# Patient Record
Sex: MALE | Race: WHITE
[De-identification: names, ages, dates, MRNs, and addresses within clinical notes are randomized per-mention and may not be internally consistent; named-entity substitution may affect disease eponyms.]

---

## 2017-04-13 ENCOUNTER — HOSPITAL ENCOUNTER (OUTPATIENT)
Dept: HOSPITAL 84 - D.CATH | Age: 67
Discharge: HOME | End: 2017-04-13
Attending: INTERNAL MEDICINE
Payer: MEDICARE

## 2017-04-13 VITALS — DIASTOLIC BLOOD PRESSURE: 72 MMHG | SYSTOLIC BLOOD PRESSURE: 162 MMHG

## 2017-04-13 VITALS — BODY MASS INDEX: 31.9 KG/M2 | BODY MASS INDEX: 31.9 KG/M2 | WEIGHT: 235.49 LBS | HEIGHT: 72 IN

## 2017-04-13 DIAGNOSIS — I10: ICD-10-CM

## 2017-04-13 DIAGNOSIS — R06.02: ICD-10-CM

## 2017-04-13 DIAGNOSIS — Z82.49: ICD-10-CM

## 2017-04-13 DIAGNOSIS — R07.9: ICD-10-CM

## 2017-04-13 DIAGNOSIS — I25.119: Primary | ICD-10-CM

## 2017-04-13 LAB
ANION GAP SERPL CALC-SCNC: 15.8 MMOL/L (ref 8–16)
BASOPHILS NFR BLD AUTO: 0.2 % (ref 0–2)
BUN SERPL-MCNC: 15 MG/DL (ref 7–18)
CALCIUM SERPL-MCNC: 8.3 MG/DL (ref 8.5–10.1)
CHLORIDE SERPL-SCNC: 102 MMOL/L (ref 98–107)
CO2 SERPL-SCNC: 27.4 MMOL/L (ref 21–32)
CREAT SERPL-MCNC: 1 MG/DL (ref 0.6–1.3)
EOSINOPHIL NFR BLD: 1.1 % (ref 0–7)
ERYTHROCYTE [DISTWIDTH] IN BLOOD BY AUTOMATED COUNT: 13.3 % (ref 11.5–14.5)
GLUCOSE SERPL-MCNC: 176 MG/DL (ref 74–106)
HCT VFR BLD CALC: 43.8 % (ref 42–54)
HGB BLD-MCNC: 14.9 G/DL (ref 13.5–17.5)
IMM GRANULOCYTES NFR BLD: 0.2 % (ref 0–5)
LYMPHOCYTES NFR BLD AUTO: 39.6 % (ref 15–50)
MCH RBC QN AUTO: 32.4 PG (ref 26–34)
MCHC RBC AUTO-ENTMCNC: 34 G/DL (ref 31–37)
MCV RBC: 95.2 FL (ref 80–100)
MONOCYTES NFR BLD: 8.5 % (ref 2–11)
NEUTROPHILS NFR BLD AUTO: 50.4 % (ref 40–80)
OSMOLALITY SERPL CALC.SUM OF ELEC: 283 MOSM/KG (ref 275–300)
PLATELET # BLD: 179 10X3/UL (ref 130–400)
PMV BLD AUTO: 11.3 FL (ref 7.4–10.4)
POTASSIUM SERPL-SCNC: 5.2 MMOL/L (ref 3.5–5.1)
RBC # BLD AUTO: 4.6 10X6/UL (ref 4.2–6.1)
SODIUM SERPL-SCNC: 140 MMOL/L (ref 136–145)
WBC # BLD AUTO: 4.4 10X3/UL (ref 4.8–10.8)

## 2017-04-13 NOTE — NUR
RESTING QUIETLY IN BED. 2L NC, NO RESP DISTRESS NOTED. VSS. NO C/O
CHEST PAIN OR NAUSEA. RIGHT WRIST TR BAND WITH WRIST PROTECTOR IN
PLACE, DRSG CDI. FAMILY AT BEDSIDE, CALL LIGHT WITHIN REACH.

## 2017-04-13 NOTE — NUR
HOB ELEVATED 45 DEGREES. SPRITE GIVEN. 2L NC, NO RESP DISTRESS NOTED.
VSS. RIGHT WRIST TR BAND IN PLACE WITH WRIST PROTECTOR, DRSG CDI. NO
C/O CHEST PAIN OR NAUSEA. WILL CONTINUE TO MONITOR.

## 2017-04-13 NOTE — HEMODYNAMI
PATIENT:MARINA ASHFORD                                      MEDICAL RECORD: [id number]
: 46                                            LOCATION:D.CAT          
ACCT# [id number]                                       ADMISSION DATE: 17
 
 
 Generatedon:20179:45
Patient name: MARINA ASHFORD Patient #: [id number] Visit #: [id number] SSN: : 
1950 Date of
study: 2017
Page: Of
Hemodynamic Procedure Report
****************************
Patient Data
Patient Demographics
Procedure consent was obtained
First Name: MARINA             Gender: Male
Last Name: DEJON          : 1950
Patient #: [id number]       Age: 66 year(s)
Visit #: [id number]       Race: Unknown
Accession #:
[id number]
Additional ID: [id number]
Contact details
Address: [address] [place] [address]    Phone: [phone]
State: [place]
City: [place]
Zip code: [address]
Past Medical History
Allergies: No known allergies
Admission
Admission Data
Admission Date: 2017   Admission Time: 6:57
Height (in.): 72            BSA: 2.28 (m2)
Height (cm.): 182.88        BMI: 31.87 (kg/m2)
Weight (lbs.): 235
Weight (kg.): 106.59
Lab Results
Lab Result Date: 2017  Lab Result Time: 0:00
Biochemistry
Name         Units    Result                Min      Max
Creatinine   mg/dl    1        --(--*-)--   0.6      1.3
CBC
Name         Units    Result                Min      Max
Hemoglobin   g/dl     14.9     --(-*--)--   13.5     17.5
Procedure
Procedure Types
Cath Procedure
Diagnostic Procedure
LHC
LHC w/Coronaries
Miscellaneous Procedures
Moderate Sedation up to 30 minutes
Procedure Description
Procedure Date
Procedure Date: 2017
Procedure Start Time: 9:28
Procedure End Time: 9:44
Procedure Staff
Name                            Function
 
Lake Schumacher MD                   Performing Physician
Sil Croft RT             Scrub
Geovanny Evans RN                Nurse
Sekou Leong RT                  Monitor
Procedure Data
Cath Procedure
Fluoroscopy
Diagnostic fluoroscopy      Total fluoroscopy Time: 4.1
time: 4.1 min               min
Diagnostic fluoroscopy      Total fluoroscopy dose: 763
dose: 763 mGy               mGy
Contrast Material
Contrast Material Type                       Amount (ml)
Isovue 300                                   107
Entry Location
Entry     Primary  Successful  Side  Size  Upsize Upsize Entry    Closure     [place]
ccessful  Closure
Location                             (Fr)  1 (Fr) 2 (Fr) Remarks  Device        
          Remarks
Radial                         Right 6 Fr                         Mechanical
artery                               Short                        Compression
Estimated blood loss: 5 ml
Diagnostic catheters
Device Type               Used For           End Catheter
Placement
Terumo 5Fr Jerald 110cm    Procedure
catheter
Diagnostic Infinity 5Fr   Procedure
AL 1 catheter
Procedure Complications
No complications
Procedure Medications
Medication           Administration Route Dosage
Oxygen               NC                   2 l/min
Heparin Flush Bag    added to field       2 bags
(1000units/500ml NS)
0.9% NaCl            I.V.                 100 ml/hr
Radial Cocktail      added to field       1 syringe
(Verapomil 2mg/Nitro
400mcg/Heparin
1500units)
Fentanyl             I.V.                 50 mcg
Versed               I.V.                 1 mg
Fentanyl             I.V.                 50 mcg
Versed               I.V.                 1 mg
Radial Cocktail      I.A.                 1 syringe
(Verapomil 2mg/Nitro
400mcg/Heparin
1500units)
Fentanyl             I.V.                 50 mcg
Hemodynamics
Rest
BSA: 2.28 (m2) HGB: 14.9 (g/dl) O2 Consumption: Estimated: 259.03 (ml/min) O2 Co
nsumption indexed:
Estimated:113.61 (ml/min/m) Heart Rate: 63 (bpm)
Pressure Samples
Time     Site     Value (mmHg) Purpose      Heart      Use
Rate(bpm)
9:32     LV       120/-4,13    Snapshot     70
Gradients
 
Valve  Time  Site Site Mean    SEP/DFP    Peak To Heart  Use
1    2    (mmHg)  (sec/min)  Peak    Rate
(mmHg)  (bpm)
Aortic 9:32  LV   AO                              70
Snapshots
Pre Cath      Intra         NCS           Post Cath
Vital Signs
Time    Heart  Resp   SPO2 etCO2   IL9hpbs NIBP (mmHg) Rhythm  Pain    Sedation
Rate   (ipm)  (%)  (mmHg)  (mmHg)                      Status  Level
(bpm)
9:02:35 63     18     100  0       0       145/77(120) NSR     0 (11)  10(A)
, No
pain
9:06:55 65     17     95   0       0       138/78(98)  NSR     0 (11)  10(A)
, No
pain
9:11:17 62     20     93   0       0       136/65(115) NSR     0 (11)  10(A)
, No
pain
9:15:40 60     16     96   0       0       133/70(112) NSR     0 (11)  10(A)
, No
pain
9:19:53 67     18     95   0       0       124/68(99)  NSR     0 (11)  10(A)
, No
pain
9:24:09 61     18     96   0       0       125/73(104) NSR     0 (11)  10(A)
, No
pain
9:28:27 59     19     96   0       0       136/69(117) NSR     0 (11)  9(A)
, No
pain
9:32:41 70     18     95   0       0       111/53(88)  NSR     0 (11)  9(A)
, No
pain
9:36:54 75     20     95   0       0       121/67(96)  NSR     0 (11)  9(A)
, No
pain
9:41:09 69     18     94   0       0       128/66(103) NSR     0 (11)  9(A)
, No
pain
9:42:48 70     19     95   0       0       120/69(97)  NSR     0 (11)  9(A)
, No
pain
Medications
Time    Medication       Route  Dose    Verified Delivered Reason       Notes  E
ffectiveness
by       by
9:05:21 Oxygen           NC     2 l/min Geovanny Small    Per
Nathan Evans RN physician
RN
9:05:31 Heparin Flush    added  2 bags  Geovanny Small    used for
Bag              to             Nathan Evans RN procedure
(1000units/500ml field          RN
NS)
9:05:42 0.9% NaCl        I.V.   100     Geovanny   Geovanny    Per
ml/hr   Nathan Evans RN physician
RN
9:05:50 Radial Cocktail  added  1       Geovanny   Geovanny    used for
(Verapomil       to     syringe Nathan Evans RN procedure
2mg/Nitro        field          RN
 
400mcg/Heparin
1500units)
9:26:04 Fentanyl         I.V.   50 mcg  Geovanny   Geovanny    for sedation
Nathan Evans RN
RN
9:26:11 Versed           I.V.   1 mg    Geovanny   Geovanny    for sedation
Nathan Evans RN
RN
9:29:37 Fentanyl         I.V.   50 mcg  Geovanny   Geovanny    for sedation
Nathan Evans RN
RN
9:29:39 Versed           I.V.   1 mg    Geovanny   Geovanny    for sedation
Nathan Evans RN
RN
9:30:59 Radial Cocktail  I.A.   1       Geovanny   Lake      for
(Verapomil              syringe Nathan Schumacher MD  vasodilation
2mg/Nitro                       RN
400mcg/Heparin
1500units)
9:33:41 Fentanyl         I.V.   50 mcg  Geovanny Small    for sedation
Nathan Evans RN
RN
Procedure Log
Time     Note
8:33:27  Geovanny Evans RN sent for patient. Start room use.
8:43:16  Lab Result : Hemoglobin 14.9 g/dl
8:43:16  Lab Result : Creatinine 1 mg/dl
8:43:26  Time tracking: Regular hours
8:43:34  Plan of Care:Hemodynamics will remain stable., Cardiac
rhythm will remain stable., Comfort level will be
maintained., Respiratory function will remain
adequate., Patient/ family verbilizes understanding of
procedure., Procedure tolerated without complication.,
Recovers from procedure without complications..
8:49:12  Patient received from Pre/Post Procedure Room to CCL 1
Alert and oriented. Tansferred to table in Supine
position.
8:49:13  Warm blankets applied, and eric hugger turned on for
patient comfort.
8:49:13  Correct patient and procedure confirmed by team.
8:49:15  Signed procedure consent form obtained from patient.
8:49:16  ECG and BP/O2 sat monitors applied to patient.
8:49:17  Full Disclosure recording started
9:01:19  Vital chart was started
9:02:08  Baseline sample Acquired.
9:02:13  Rhythm: sinus rhythm
9:02:32  H&P Date Dictated: 2017 Within 30 days and on
chart., H&P Addendum completed by physician on day of
procedure. (MUST COMPLETE FOR ALL OUTPATIENTS).
9:02:34  Pre-procedure instructions explained to patient.
9:02:34  Pre-op teaching completed and patient verbalized
understanding.
9:02:36  Family in waiting room.
9:02:37  Patient NPO since Midnight.
9:02:42  Patient allergic to No known allergies
9:02:46  Is the patient allergic to Iodine/contrast media? No.
9:02:55  Is patient on blood thinner?No
9:02:56  Patient diabetic? Yes.
9:02:57  If diabetic: On Metformin? Yes
9:05:15  If on Metformin: Last Dose? 2017
 
9:05:19  Previous problem with sedation/anesthesia? No ?
9:05:21  Oxygen 2 l/min NC was administered by Geovanny Evans
RN; Per physician;
9:05:22  Snore? Yes
9:05:23  Sleep apnea? Yes
9:05:24  Deviated septum? No
9:05:27  Opens mouth fully? Yes
9:05:27  Sticks out tongue? Yes
9:05:30  Airway obstruction? No ?
9:05:31  Heparin Flush Bag (1000units/500ml NS) 2 bags added to
field was administered by Geovanny Evans RN; used for
procedure;
9:05:34  Dentures? No ?
9:05:39  Patient pain scale 0/10 ?.
9:05:42  0.9% NaCl 100 ml/hr I.V. was administered by Geovanny Evans RN; Per physician;
9:05:50  Radial Cocktail (Verapomil 2mg/Nitro 400mcg/Heparin
1500units) 1 syringe added to field was administered
by Geovanny Evans RN; used for procedure;
9:05:52  IV patent on arrival in left hand with 0.9% NaCl at
[facility].
9:05:58  Lab results completed and on chart.
9:06:00  Right Radial & Right Groin area was prepped with
chlora-prep and draped in sterile fashion
9:06:02  Alarms reviewed by R. N.
9:06:02  Sharps counted by scrub and verified by R.N.
9:06:07  Use device set Radial Dx
9:06:08  MBrace Wrist Support opened to sterile field.
9:06:09  Tegaderm 4 x 4 opened to sterile field.
9:06:10  Acist Manifold opened to sterile field.
9:06:10  Acist Hand Control opened to sterile field.
9:06:11  Acist Syringe opened to sterile field.
9:06:12  Medline Cath Pack opened to sterile field.
9:06:13  Bag Decanter opened to sterile field.
9:06:13  Terumo 6Fr Slender Glidesheath opened to sterile
field.
9:06:14  St Ace 260cm J .035 wire opened to sterile field.
9:06:34  Patient Weight : 235 kg
9:06:43  Patient Height : 72 cm
9:07:47  Cook 21G 4cm Radial Needle opened to sterile field.
9:14:50  Zero performed for pressure channel P1
9:24:15  Physician arrived
9:24:15  --------ALL STOP TIME OUT------
9:24:16  Final Timeout: patient, procedure, and site verified
with staff and physician. All members of the team are
in agreement.
9:24:19  Right Radial & Right Groin site verified by team.
9:24:22  Physical assessment completed. ASA score P 2 - A
patient with mild systemic disease as per Lake Schumacher MD.
9:24:25  Sedation plan: IV Moderate Sedation Versed, Fentanyl
9:26:04  Fentanyl 50 mcg I.V. was administered by Geovanny Evans
RN; for sedation;
9::11  Versed 1 mg I.V. was administered by Geovanny Evans RN;
for sedation;
9::37  Procedure started.
9::42  Local anesthetic to right radial artery with Lidocaine
2% by Lake Schumacher MD.**INITIAL ACCESS ONLY**
9::37  Fentanyl 50 mcg I.V. was administered by Geovanny Evans
RN; for sedation;
 
9::39  Versed 1 mg I.V. was administered by Geovanny Evans RN;
for sedation;
9:30:04  A 6 Fr Short sheath was inserted into the Right Radial
artery
9:30:52  A Terumo 5Fr Jerald 110cm catheter was advanced over
the wire and used for Procedure.
9:30:59  Radial Cocktail (Verapomil 2mg/Nitro 400mcg/Heparin
1500units) 1 syringe I.A. was administered by Lake Schumacher MD; for vasodilation;
9:32:25  LV gram done using KEMP
9:32:29  Injector settings: Ml/sec: 5, Volume: 15,
9:32:33  EF : 55 %
9:32:34  LV hemodynamics recorded.
9:33:12  LCA angiography performed.
9:33:41  Fentanyl 50 mcg I.V. was administered by Geovanny Evans
RN; for sedation;
9:37:00  Catheter exchanged over wire.
9:37:15  A Diagnostic Infinity 5Fr AL 1 catheter was advanced
over the wire and used for Procedure.
9:37:59  RCA angiography performed.
9:39:32  Catheter removed.
9:40:19  Terumo TR Band Standard opened to sterile field.
9:41:46  Sheath removed intact; hemostasis achieved with
Mechanical Compression to the Right Radial artery.
9:41:48  Procedure ended.(Physican Out)
9:42:20  Fluoroscopy time 04.10 minutes.
9:42:24  Fluoroscopy dose: 763 mGy
9:42:24  Flurop Dose total: 763
9:42:48  Contrast amount:Isovue 300 107ml.
9:42:50  Sharps counted by scrub and verified by R.N.
9:42:53  TR band inflated with 12cc of air.
9:42:54  Insertion/operative site no bleeding no hematoma.
9:43:01  Post right radial artery:stable, clean and dry
9:43:03  Post Procedure Pulses reassessed and unchanged
9:43:05  Post-procedure physical assessment completed. ASA
score P 2 - A patient with mild systemic disease as
per Lake Schumacher MD.
9:43:08  Post procedure rhythm: unchanged.
9:43:11  Estimated blood loss: 5 ml
9:43:13  Post procedure instruction explained to
patient.Patient verbalizes understanding.
9:43:14  Patient needs reinforcement of post procedure
teaching.
9:44:25  Procedure type changed to Cath procedure, Diagnostic
procedure, LHC, LHC w/Coronaries, Miscellaneous
Procedures, Moderate Sedation up to 30 minutes
9:44:26  Procedure and supply charges have been captured,
reviewed, submitted and are correct.
9:44:47  Procedure Complication : No complications
9:44:50  Vital chart was stopped
9:44:50  See physician's report for complete and final results.
9:44:54  Report given to Pre/Post Procedure Room.
9:44:56  Patient transfered to Pre/Post Procedure Room with
Stretcher.
9:44:58  Procedure ended.
9:44:58  Full Disclosure recording stopped
9:45:03  End room use (Document Last)
Device Usage
Item Name   Manufacture  Quantity  Catalog      Hospital Part    Current Minimal
 Lot# /
 
Number       Charge   Number  Stock   Stock   Serial#
Code
MBrace      Advanced     1         [id number]  [id number]   82995   840313  5
Wrist       Vascular
Support     Dynamics
Tegaderm 4  3M           1         [id number]        919530   166532  422547  5
x 4
Acist       Acist        1         13113        236722   773832  497128  5
Manifold    [employer]
Acist Hand  Acist        1         73517        659912   559661  679164  5
Control     [employer]
Acist       Acist        1         35291        594704   739407  363339  20
Syringe     Medical
Systems [employer]
Medline     Cardinal     1         [id number]    587182   94482   556500  5
Cath Pack   Health
Bag         [employer]     1         2002S        996263   16420   648515  5
[employer]
Terumo 6Fr  Terumo       1         [id number]   029192   034840  904545  40
Slender
Glidesheath
St Ace     St Ace      1         214816       320081   005900  016041  30
260cm J
.035 wire
Cook 21G    Cook Medical 1         [id number]       526161   101171  869947  5
4cm Radial
Needle
Terumo 5Fr  Terumo       1               025542   375270  787947  5
Jerald 110cm
catheter
Diagnostic  Cardinal     1         [id number]      582905   090505  684955  15
[employer]    Health
5Fr AL 1
catheter
Terumo TR   Terumo       1         [id number]    647240   402827  211267  40
Band
Standard
Signature Audit [place]
Stage           Time        Signature      Unsigned
Intra-Procedure 2017   Sekou Leong
9:45:29 AM  RT(R)
Signatures
Monitor : Sekou Leong RT Signature :
______________________________
Date : ______________ Time :
______________
 
 
 
 
 
 
 
[facility]                                          
[address] [place] [address]

## 2017-04-13 NOTE — NUR
2L NC, NO RESP DISTRESS NOTED. VSS. RIGHT WRIST TR BAND WITH WRIST
PROTECTOR IN PLACE, DRSG CDI. NO C/O CHEST PAIN OR NAUSEA. CALL LIGHT
WITHIN REACH.

## 2017-04-13 NOTE — NUR
SANDWICH TRAY GIVEN. 2L NC, NO RESP DISTRESS. NO C/O CHEST PAIN OR
NAUSEA. RIGHT WRIST TR BAND AND WRIST PROTECTOR IN PLACE, DRSG CDI.
VSS. WILL CONTINUE TO MONITOR.

## 2017-04-14 NOTE — OP
PATIENT NAME:  MARINA ASHFORD                               MEDICAL RECORD: [id number]
:46                                             LOCATION:D.CAT          
                                                         ADMISSION DATE:        
SURGEON:  J CARLOS PALMER M.D.          
 
 
DATE OF OPERATION:  2017
 
Catheterization Report
 
PROCEDURES PERFORMED:
1.  Selective coronary angiography.
2.  Left heart catheterization with ventriculogram.
 
INDICATION:  A 66-year-old gentleman, who presents with symptoms of accelerating
angina.
 
REFERRING PHYSICIAN:  Jayden Friedman MD
 
EQUIPMENT USED:  A 5-Lao Jerald catheter.
 
TECHNIQUE:  A 6-Lao sheath was inserted in retrograde fashion in the right
radial artery.  Next, selective coronary angiography was performed in standard
5-Lao Jerald catheter.  Left heart catheterization was performed using a Jerald
catheter as well.
 
CORONARY ANATOMY:
1.  Left main:  Left main trunk is large in caliber.  It gives rise to the LAD
and circumflex.  There is no obstruction.
2.  LAD:  This is a large caliber vessel extending to the apex.  The proximal
vessel has an ulcerated 90% stenosis.
3.  Circumflex:  This vessel is moderate in caliber.  At the bifurcation point,
the lateral branch in the AV circumflex, there is angulated 95% stenosis.  It is
heavily calcified in this area as well.
4.  Right coronary artery:  This vessel actually arises from the noncoronary
cusp.  The proximal mid vessel is moderately calcified.  The junction of the mid
and distal vessel has a 95% stenosis.  There is another 95% stenosis before the
bifurcation.
5.  Left ventricle:  Left ventricle is normal in size and function.  No wall
motion abnormalities are noted.  Estimated ejection fraction is 55%.
 
IMPRESSION:
1.  Three-vessel coronary artery disease.
2.  Normal left ventricular function.
 
RECOMMENDATIONS:  I will review the situation with the patient regarding
multivessel angioplasty versus bypass grafting.  Because of the calcified nature
of his vessels, he may do better long term with bypass grafting.
 
TRANSINT:[id number] Voice Confirmation ID: [id number] DOCUMENT ID: [id number]
 
 
 
OPERATIVE REPORT                               [id number]    MARINA ASHFORDJ CARLOS HELMS M.D.          
 
 
 
Electronically Signed by J CARLOS PALMER on 17 at 1224
 
 
 
 
 
 
 
 
 
 
 
 
 
 
 
 
 
 
 
 
 
 
 
 
 
 
 
 
 
 
 
 
 
 
 
 
 
 
 
 
 
CC:                                                             [id number]
DICTATION DATE: 17 0944     :     17 1113      DEP CLI 
                                                                      17
[facility]                                          
[address] [place] [address]

## 2017-04-17 ENCOUNTER — HOSPITAL ENCOUNTER (OUTPATIENT)
Dept: HOSPITAL 84 - D.US | Age: 67
Discharge: HOME | End: 2017-04-17
Attending: THORACIC SURGERY (CARDIOTHORACIC VASCULAR SURGERY)
Payer: MEDICARE

## 2017-04-17 VITALS — BODY MASS INDEX: 31.9 KG/M2

## 2017-04-17 DIAGNOSIS — I65.23: ICD-10-CM

## 2017-04-17 DIAGNOSIS — Z01.812: Primary | ICD-10-CM

## 2017-04-17 DIAGNOSIS — I25.10: ICD-10-CM

## 2017-04-18 LAB — HCV AB S/CO SERPL IA: <0.1 (ref 0–0.9)

## 2017-04-20 LAB
ALBUMIN SERPL-MCNC: 3.9 G/DL (ref 3.4–5)
ALP SERPL-CCNC: 50 U/L (ref 46–116)
ALT SERPL-CCNC: 42 U/L (ref 10–68)
ANION GAP SERPL CALC-SCNC: 12.7 MMOL/L (ref 8–16)
APPEARANCE UR: CLEAR
APTT BLD: 24.3 SECONDS (ref 22.8–39.4)
BASOPHILS NFR BLD AUTO: 0.2 % (ref 0–2)
BILIRUB SERPL-MCNC: 0.33 MG/DL (ref 0.2–1.3)
BILIRUB SERPL-MCNC: NEGATIVE MG/DL
BUN SERPL-MCNC: 15 MG/DL (ref 7–18)
CA TITR SERPL AGGL: (no result) {TITER}
CALCIUM SERPL-MCNC: 9.1 MG/DL (ref 8.5–10.1)
CHLORIDE SERPL-SCNC: 104 MMOL/L (ref 98–107)
CO2 SERPL-SCNC: 30.9 MMOL/L (ref 21–32)
COLD SCREEN ROOM TEMP: NEGATIVE
COLOR UR: YELLOW
CREAT SERPL-MCNC: 1.1 MG/DL (ref 0.6–1.3)
EOSINOPHIL NFR BLD: 0.8 % (ref 0–7)
ERYTHROCYTE [DISTWIDTH] IN BLOOD BY AUTOMATED COUNT: 12.9 % (ref 11.5–14.5)
EST. AVERAGE GLUCOSE BLD GHB EST-MCNC: 194 MG/DL (ref 74–154)
GLOBULIN SER-MCNC: 3.2 G/L
GLUCOSE SERPL-MCNC: 100 MG/DL
GLUCOSE SERPL-MCNC: 221 MG/DL (ref 74–106)
HBA1C MFR BLD: 8.4 % (ref 4.8–6)
HCT VFR BLD CALC: 42.4 % (ref 42–54)
HGB BLD-MCNC: 14.2 G/DL (ref 13.5–17.5)
IMM GRANULOCYTES NFR BLD: 0.2 % (ref 0–5)
INR PPP: 0.91 (ref 0.85–1.17)
KETONES UR STRIP-MCNC: NEGATIVE MG/DL
LEUKOCYTE ESTERASE: NEGATIVE
LYMPHOCYTES NFR BLD AUTO: 30.5 % (ref 15–50)
MCH RBC QN AUTO: 32.3 PG (ref 26–34)
MCHC RBC AUTO-ENTMCNC: 33.5 G/DL (ref 31–37)
MCV RBC: 96.4 FL (ref 80–100)
MONOCYTES NFR BLD: 6.4 % (ref 2–11)
NEUTROPHILS NFR BLD AUTO: 61.9 % (ref 40–80)
NITRITE UR-MCNC: NEGATIVE MG/ML
OSMOLALITY SERPL CALC.SUM OF ELEC: 292 MOSM/KG (ref 275–300)
PH UR STRIP: 5 [PH] (ref 5–6)
PHOSPHATE SERPL-MCNC: 2.7 MG/DL (ref 2.5–4.9)
PLATELET # BLD: 163 10X3/UL (ref 130–400)
PMV BLD AUTO: 9.3 FL (ref 7.4–10.4)
POTASSIUM SERPL-SCNC: 4.6 MMOL/L (ref 3.5–5.1)
PROT SERPL-MCNC: 7.1 G/DL (ref 6.4–8.2)
PROT UR-MCNC: NEGATIVE MG/DL
PROTHROMBIN TIME: 12.1 SECONDS (ref 11.6–15)
RBC # BLD AUTO: 4.4 10X6/UL (ref 4.2–6.1)
SODIUM SERPL-SCNC: 143 MMOL/L (ref 136–145)
SP GR UR STRIP: 1.01 (ref 1–1.02)
T4 FREE SERPL-MCNC: 0.92 NG/DL (ref 0.76–1.46)
TSH SERPL-ACNC: 2.54 UIU/ML (ref 0.36–3.74)
URATE SERPL-MCNC: 5.3 MG/DL (ref 2.6–7.2)
UROBILINOGEN UR-MCNC: NORMAL MG/DL
WBC # BLD AUTO: 4.9 10X3/UL (ref 4.8–10.8)

## 2017-04-21 ENCOUNTER — HOSPITAL ENCOUNTER (INPATIENT)
Dept: HOSPITAL 84 - D.SDCHOLD | Age: 67
LOS: 11 days | Discharge: HOME | DRG: 236 | End: 2017-05-02
Attending: THORACIC SURGERY (CARDIOTHORACIC VASCULAR SURGERY) | Admitting: THORACIC SURGERY (CARDIOTHORACIC VASCULAR SURGERY)
Payer: MEDICARE

## 2017-04-21 VITALS — DIASTOLIC BLOOD PRESSURE: 49 MMHG | SYSTOLIC BLOOD PRESSURE: 114 MMHG

## 2017-04-21 VITALS — SYSTOLIC BLOOD PRESSURE: 129 MMHG | DIASTOLIC BLOOD PRESSURE: 55 MMHG

## 2017-04-21 VITALS — SYSTOLIC BLOOD PRESSURE: 105 MMHG | DIASTOLIC BLOOD PRESSURE: 54 MMHG

## 2017-04-21 VITALS — SYSTOLIC BLOOD PRESSURE: 114 MMHG | DIASTOLIC BLOOD PRESSURE: 48 MMHG

## 2017-04-21 VITALS — SYSTOLIC BLOOD PRESSURE: 119 MMHG | DIASTOLIC BLOOD PRESSURE: 46 MMHG

## 2017-04-21 VITALS — SYSTOLIC BLOOD PRESSURE: 115 MMHG | DIASTOLIC BLOOD PRESSURE: 52 MMHG

## 2017-04-21 VITALS — SYSTOLIC BLOOD PRESSURE: 115 MMHG | DIASTOLIC BLOOD PRESSURE: 51 MMHG

## 2017-04-21 VITALS — DIASTOLIC BLOOD PRESSURE: 50 MMHG | SYSTOLIC BLOOD PRESSURE: 116 MMHG

## 2017-04-21 VITALS — DIASTOLIC BLOOD PRESSURE: 55 MMHG | SYSTOLIC BLOOD PRESSURE: 115 MMHG

## 2017-04-21 VITALS — SYSTOLIC BLOOD PRESSURE: 108 MMHG | DIASTOLIC BLOOD PRESSURE: 57 MMHG

## 2017-04-21 VITALS — SYSTOLIC BLOOD PRESSURE: 119 MMHG | DIASTOLIC BLOOD PRESSURE: 51 MMHG

## 2017-04-21 VITALS — SYSTOLIC BLOOD PRESSURE: 110 MMHG | DIASTOLIC BLOOD PRESSURE: 45 MMHG

## 2017-04-21 VITALS — SYSTOLIC BLOOD PRESSURE: 108 MMHG | DIASTOLIC BLOOD PRESSURE: 44 MMHG

## 2017-04-21 VITALS
HEIGHT: 72 IN | WEIGHT: 229.68 LBS | WEIGHT: 229.68 LBS | BODY MASS INDEX: 31.11 KG/M2 | BODY MASS INDEX: 31.11 KG/M2 | BODY MASS INDEX: 31.11 KG/M2 | HEIGHT: 72 IN | BODY MASS INDEX: 31.11 KG/M2

## 2017-04-21 VITALS — SYSTOLIC BLOOD PRESSURE: 118 MMHG | DIASTOLIC BLOOD PRESSURE: 52 MMHG

## 2017-04-21 VITALS — DIASTOLIC BLOOD PRESSURE: 45 MMHG | SYSTOLIC BLOOD PRESSURE: 118 MMHG

## 2017-04-21 VITALS — DIASTOLIC BLOOD PRESSURE: 50 MMHG | SYSTOLIC BLOOD PRESSURE: 111 MMHG

## 2017-04-21 VITALS — DIASTOLIC BLOOD PRESSURE: 90 MMHG | SYSTOLIC BLOOD PRESSURE: 166 MMHG

## 2017-04-21 VITALS — SYSTOLIC BLOOD PRESSURE: 116 MMHG | DIASTOLIC BLOOD PRESSURE: 47 MMHG

## 2017-04-21 VITALS — DIASTOLIC BLOOD PRESSURE: 60 MMHG | SYSTOLIC BLOOD PRESSURE: 140 MMHG

## 2017-04-21 VITALS — DIASTOLIC BLOOD PRESSURE: 46 MMHG | SYSTOLIC BLOOD PRESSURE: 115 MMHG

## 2017-04-21 VITALS — DIASTOLIC BLOOD PRESSURE: 51 MMHG | SYSTOLIC BLOOD PRESSURE: 95 MMHG

## 2017-04-21 VITALS — DIASTOLIC BLOOD PRESSURE: 54 MMHG | SYSTOLIC BLOOD PRESSURE: 140 MMHG

## 2017-04-21 VITALS — SYSTOLIC BLOOD PRESSURE: 118 MMHG | DIASTOLIC BLOOD PRESSURE: 50 MMHG

## 2017-04-21 VITALS — SYSTOLIC BLOOD PRESSURE: 103 MMHG | DIASTOLIC BLOOD PRESSURE: 55 MMHG

## 2017-04-21 VITALS — SYSTOLIC BLOOD PRESSURE: 115 MMHG | DIASTOLIC BLOOD PRESSURE: 55 MMHG

## 2017-04-21 VITALS — SYSTOLIC BLOOD PRESSURE: 122 MMHG | DIASTOLIC BLOOD PRESSURE: 48 MMHG

## 2017-04-21 VITALS — DIASTOLIC BLOOD PRESSURE: 54 MMHG | SYSTOLIC BLOOD PRESSURE: 120 MMHG

## 2017-04-21 VITALS — SYSTOLIC BLOOD PRESSURE: 112 MMHG | DIASTOLIC BLOOD PRESSURE: 49 MMHG

## 2017-04-21 VITALS — DIASTOLIC BLOOD PRESSURE: 52 MMHG | SYSTOLIC BLOOD PRESSURE: 123 MMHG

## 2017-04-21 VITALS — DIASTOLIC BLOOD PRESSURE: 61 MMHG | SYSTOLIC BLOOD PRESSURE: 136 MMHG

## 2017-04-21 DIAGNOSIS — E11.9: ICD-10-CM

## 2017-04-21 DIAGNOSIS — I48.0: ICD-10-CM

## 2017-04-21 DIAGNOSIS — I10: ICD-10-CM

## 2017-04-21 DIAGNOSIS — Z86.010: ICD-10-CM

## 2017-04-21 DIAGNOSIS — K56.0: ICD-10-CM

## 2017-04-21 DIAGNOSIS — K21.9: ICD-10-CM

## 2017-04-21 DIAGNOSIS — K58.0: ICD-10-CM

## 2017-04-21 DIAGNOSIS — M19.90: ICD-10-CM

## 2017-04-21 DIAGNOSIS — I25.119: Primary | ICD-10-CM

## 2017-04-21 DIAGNOSIS — E78.5: ICD-10-CM

## 2017-04-21 DIAGNOSIS — Z91.09: ICD-10-CM

## 2017-04-21 DIAGNOSIS — M10.9: ICD-10-CM

## 2017-04-21 LAB
ANION GAP SERPL CALC-SCNC: 10.6 MMOL/L (ref 8–16)
APTT BLD: 33.1 SECONDS (ref 22.8–39.4)
BUN SERPL-MCNC: 11 MG/DL (ref 7–18)
CALCIUM SERPL-MCNC: 7 MG/DL (ref 8.5–10.1)
CHLORIDE SERPL-SCNC: 110 MMOL/L (ref 98–107)
CO2 SERPL-SCNC: 31.5 MMOL/L (ref 21–32)
CREAT SERPL-MCNC: 0.9 MG/DL (ref 0.6–1.3)
ERYTHROCYTE [DISTWIDTH] IN BLOOD BY AUTOMATED COUNT: 13 % (ref 11.5–14.5)
GLUCOSE SERPL-MCNC: 183 MG/DL (ref 74–106)
HCT VFR BLD CALC: 26.1 % (ref 42–54)
HGB BLD-MCNC: 8.9 G/DL (ref 13.5–17.5)
INR PPP: 1.39 (ref 0.85–1.17)
MCH RBC QN AUTO: 32.4 PG (ref 26–34)
MCHC RBC AUTO-ENTMCNC: 34.1 G/DL (ref 31–37)
MCV RBC: 94.9 FL (ref 80–100)
OSMOLALITY SERPL CALC.SUM OF ELEC: 299 MOSM/KG (ref 275–300)
PA ADP PRP-ACNC: 335 PRU (ref 194–418)
PLATELET # BLD EST: (no result) 10*3/UL
PLATELET # BLD: 84 10X3/UL (ref 130–400)
PMV BLD AUTO: 8.8 FL (ref 7.4–10.4)
POTASSIUM SERPL-SCNC: 3.1 MMOL/L (ref 3.5–5.1)
PROTHROMBIN TIME: 17 SECONDS (ref 11.6–15)
RBC # BLD AUTO: 2.75 10X6/UL (ref 4.2–6.1)
SODIUM SERPL-SCNC: 149 MMOL/L (ref 136–145)
WBC # BLD AUTO: 6.9 10X3/UL (ref 4.8–10.8)

## 2017-04-21 PROCEDURE — 021109W BYPASS CORONARY ARTERY, TWO ARTERIES FROM AORTA WITH AUTOLOGOUS VENOUS TISSUE, OPEN APPROACH: ICD-10-PCS | Performed by: THORACIC SURGERY (CARDIOTHORACIC VASCULAR SURGERY)

## 2017-04-21 PROCEDURE — 02100AC BYPASS CORONARY ARTERY, ONE ARTERY FROM THORACIC ARTERY WITH AUTOLOGOUS ARTERIAL TISSUE, OPEN APPROACH: ICD-10-PCS | Performed by: THORACIC SURGERY (CARDIOTHORACIC VASCULAR SURGERY)

## 2017-04-21 PROCEDURE — 5A1221Z PERFORMANCE OF CARDIAC OUTPUT, CONTINUOUS: ICD-10-PCS | Performed by: THORACIC SURGERY (CARDIOTHORACIC VASCULAR SURGERY)

## 2017-04-21 PROCEDURE — 06BP0ZZ EXCISION OF RIGHT SAPHENOUS VEIN, OPEN APPROACH: ICD-10-PCS | Performed by: THORACIC SURGERY (CARDIOTHORACIC VASCULAR SURGERY)

## 2017-04-21 NOTE — NUR
REASSESSMENT COMPLETED. SEE FLOWSHEET FOR ALL FINDINGS. AWAKE AND AOX4. PERRLA
NOTED. MAEW. FOLLOWS COMMANDS. ABLE TO EXPRESS NEEDS. SPPECH IS CLEAR. RESP
EVEN AND UNLABORED. SPO2 98% ON O2 AT 4 LPM NC. LUNGS CTA. OCC PROD COUGH
NOTED. AV PACED ON THE MONITOR. TEMP PM INTACT AT . PULSES PALP. A LINE
INTACT WITH GOOD WAVE FORM R IJ SWAN PATENT, LOCKED, AND SECURED. MEDIASTINAL
CHEST TUBES INTACT TO 20 SM SX. MINIMAL BLOODY DRNG SEEN. PCA MORPHINE IN USE
FOR PAIN CONTROL. TURNED AND REPOSITIONED. TAKING ICE CHIPS. ABD SOFT, BSA X4.
F/C PATENT WITH GABRIEL UOP TO CRITICORE. HOB UP. REMAINS IN 1:1 NURSING CARE.
CONT CURRENT POC.

## 2017-04-21 NOTE — NUR
REPORT RECVD. CARE ASSUMED. INITIAL ASSMNT COMPLETED. SEE FLOWSHEET FOR ALL
FINDINGS. CALM/COOPERATIVE ON MECH VENT. CPAP TRIAL INITIATED BY RT. SPO2
100%. LUNGS CTA PER AUSC. FOLLOWS COMMANDS. MAEW. AV PACED ON THE MONITOR.
TEMP PM INTACT AT . PULSES PALP. RIGHT RADIAL A-LINE ZEROED AND
BALANCED. GOOD WAVE FORM SEEN. SYS B/P WITHIN PARAMETERS. DOPAMINE WEANING AND
NTG TITRATION IN PROGRESS. RIGHT IJ SWAN INTACT AT 52CM. PATENT, LOCKED, AND
SECURED. STERNAL DRESSINGS CDI. ANTERIOR, POSTERIOR AND LEFT MEDIASTINAL CHEST
TUBES PATENT AND INTACT TO 20 SM SX. MINIMAL BLOODY DRNG SEEN. NO AIR LEAKS
NOTED. AFEBRILE. ABD SOFT, ROUND WITH HYPO BSX4. OGT TO LIWS. F/C PATENT TO
CRITICORE WITH GABRIEL UOP. TEDS/SCDS IN USE. ORAL CARE PER VAP. REPOSITIONED
FOR COMFORT AND SKIN INTEGRITY. HOB UP. 1:1 NURSING CARE IN PLACE. CONT
CURRENT POC.

## 2017-04-21 NOTE — NUR
ABGS DRAWN AND RESULTED. K+ LEVEL TREATED. ALL OTHER VALUES WITHIN PARAMETERS.
 
EXTUBATED TO 4LPM NC. SPO2 98% VSS. CONT CURRET POC.

## 2017-04-21 NOTE — PN
PATIENT:MARINA ASHFORD                           MEDICAL RECORD: [id number]
                                                         [facility]
                                                         ADMISSION DATE: 04/21/17
 
PROGRESS NOTE
 
 
DATE OF SERVICE:  04/29/2017
 
Progress Note Addendum
 
CHIEF COMPLAINT:  Better.
 
The patient is improved.  His nasogastric tube has been removed by Dr. Gilliam. 
He is having diarrheal stools.  He is not having any complaints today.  I
personally discussed this case with his nurse.  He is going to be started on a
diet.  There is no peritonitis.  I will see him on a p.r.n. basis.
 
This is a progress note addendum.  For the typed portion of the progress note
including the past medical, surgical history, allergies, social history as well
as current medications, please see the chart.
 
REVIEW OF SYSTEMS:  As described above, otherwise negative.
 
PHYSICAL EXAMINATION:
GENERAL:  The patient does not appear acutely ill.  He does not appear
chronically ill.
VITAL SIGNS:  Reviewed.
HEAD:  External ears appear normal.
EYES:  Extraocular movements are intact.
NECK:  Trachea is midline.
CHEST:  No intercostal retractions.
PULMONARY:  Nonlabored, no stridor.
ABDOMEN:  Protuberant.
EXTREMITIES:  No peripheral cyanosis
INTEGUMENT:  No rash.
PSYCHIATRIC:  Normal affect.
 
IMPRESSION:  Resolving ileus.
 
PLAN:  I will see the patient on a p.r.n. basis.
 
TRANSINT:[id number] Voice Confirmation ID: [id number] DOCUMENT ID: [id number]
 
 
 
 
                                           
                                           KARTIK BLISS MD            
 
 
CC:                                                             [id number]
DICTATION DATE: 04/30/17 1226     :     05/01/17 0053      ADM IN  
                                                                              
[id number] [place] [address]

## 2017-04-22 VITALS — SYSTOLIC BLOOD PRESSURE: 108 MMHG | DIASTOLIC BLOOD PRESSURE: 44 MMHG

## 2017-04-22 VITALS — SYSTOLIC BLOOD PRESSURE: 114 MMHG | DIASTOLIC BLOOD PRESSURE: 45 MMHG

## 2017-04-22 VITALS — SYSTOLIC BLOOD PRESSURE: 110 MMHG | DIASTOLIC BLOOD PRESSURE: 44 MMHG

## 2017-04-22 VITALS — DIASTOLIC BLOOD PRESSURE: 58 MMHG | SYSTOLIC BLOOD PRESSURE: 131 MMHG

## 2017-04-22 VITALS — SYSTOLIC BLOOD PRESSURE: 110 MMHG | DIASTOLIC BLOOD PRESSURE: 50 MMHG

## 2017-04-22 VITALS — SYSTOLIC BLOOD PRESSURE: 102 MMHG | DIASTOLIC BLOOD PRESSURE: 50 MMHG

## 2017-04-22 VITALS — DIASTOLIC BLOOD PRESSURE: 54 MMHG | SYSTOLIC BLOOD PRESSURE: 123 MMHG

## 2017-04-22 VITALS — SYSTOLIC BLOOD PRESSURE: 112 MMHG | DIASTOLIC BLOOD PRESSURE: 46 MMHG

## 2017-04-22 VITALS — SYSTOLIC BLOOD PRESSURE: 110 MMHG | DIASTOLIC BLOOD PRESSURE: 42 MMHG

## 2017-04-22 VITALS — DIASTOLIC BLOOD PRESSURE: 50 MMHG | SYSTOLIC BLOOD PRESSURE: 109 MMHG

## 2017-04-22 VITALS — SYSTOLIC BLOOD PRESSURE: 112 MMHG | DIASTOLIC BLOOD PRESSURE: 49 MMHG

## 2017-04-22 VITALS — SYSTOLIC BLOOD PRESSURE: 131 MMHG | DIASTOLIC BLOOD PRESSURE: 53 MMHG

## 2017-04-22 VITALS — DIASTOLIC BLOOD PRESSURE: 58 MMHG | SYSTOLIC BLOOD PRESSURE: 124 MMHG

## 2017-04-22 VITALS — SYSTOLIC BLOOD PRESSURE: 111 MMHG | DIASTOLIC BLOOD PRESSURE: 48 MMHG

## 2017-04-22 VITALS — DIASTOLIC BLOOD PRESSURE: 49 MMHG | SYSTOLIC BLOOD PRESSURE: 118 MMHG

## 2017-04-22 VITALS — SYSTOLIC BLOOD PRESSURE: 123 MMHG | DIASTOLIC BLOOD PRESSURE: 54 MMHG

## 2017-04-22 VITALS — SYSTOLIC BLOOD PRESSURE: 126 MMHG | DIASTOLIC BLOOD PRESSURE: 56 MMHG

## 2017-04-22 VITALS — DIASTOLIC BLOOD PRESSURE: 55 MMHG | SYSTOLIC BLOOD PRESSURE: 124 MMHG

## 2017-04-22 VITALS — SYSTOLIC BLOOD PRESSURE: 110 MMHG | DIASTOLIC BLOOD PRESSURE: 54 MMHG

## 2017-04-22 VITALS — SYSTOLIC BLOOD PRESSURE: 122 MMHG | DIASTOLIC BLOOD PRESSURE: 52 MMHG

## 2017-04-22 VITALS — SYSTOLIC BLOOD PRESSURE: 116 MMHG | DIASTOLIC BLOOD PRESSURE: 57 MMHG

## 2017-04-22 VITALS — SYSTOLIC BLOOD PRESSURE: 111 MMHG | DIASTOLIC BLOOD PRESSURE: 46 MMHG

## 2017-04-22 VITALS — SYSTOLIC BLOOD PRESSURE: 115 MMHG | DIASTOLIC BLOOD PRESSURE: 53 MMHG

## 2017-04-22 VITALS — SYSTOLIC BLOOD PRESSURE: 101 MMHG | DIASTOLIC BLOOD PRESSURE: 49 MMHG

## 2017-04-22 VITALS — SYSTOLIC BLOOD PRESSURE: 116 MMHG | DIASTOLIC BLOOD PRESSURE: 50 MMHG

## 2017-04-22 VITALS — SYSTOLIC BLOOD PRESSURE: 113 MMHG | DIASTOLIC BLOOD PRESSURE: 52 MMHG

## 2017-04-22 VITALS — SYSTOLIC BLOOD PRESSURE: 106 MMHG | DIASTOLIC BLOOD PRESSURE: 47 MMHG

## 2017-04-22 VITALS — DIASTOLIC BLOOD PRESSURE: 56 MMHG | SYSTOLIC BLOOD PRESSURE: 134 MMHG

## 2017-04-22 VITALS — SYSTOLIC BLOOD PRESSURE: 113 MMHG | DIASTOLIC BLOOD PRESSURE: 47 MMHG

## 2017-04-22 VITALS — SYSTOLIC BLOOD PRESSURE: 119 MMHG | DIASTOLIC BLOOD PRESSURE: 51 MMHG

## 2017-04-22 VITALS — DIASTOLIC BLOOD PRESSURE: 53 MMHG | SYSTOLIC BLOOD PRESSURE: 110 MMHG

## 2017-04-22 VITALS — SYSTOLIC BLOOD PRESSURE: 116 MMHG | DIASTOLIC BLOOD PRESSURE: 48 MMHG

## 2017-04-22 VITALS — DIASTOLIC BLOOD PRESSURE: 50 MMHG | SYSTOLIC BLOOD PRESSURE: 110 MMHG

## 2017-04-22 VITALS — SYSTOLIC BLOOD PRESSURE: 110 MMHG | DIASTOLIC BLOOD PRESSURE: 47 MMHG

## 2017-04-22 VITALS — DIASTOLIC BLOOD PRESSURE: 52 MMHG | SYSTOLIC BLOOD PRESSURE: 113 MMHG

## 2017-04-22 VITALS — SYSTOLIC BLOOD PRESSURE: 126 MMHG | DIASTOLIC BLOOD PRESSURE: 55 MMHG

## 2017-04-22 VITALS — DIASTOLIC BLOOD PRESSURE: 54 MMHG | SYSTOLIC BLOOD PRESSURE: 134 MMHG

## 2017-04-22 VITALS — DIASTOLIC BLOOD PRESSURE: 51 MMHG | SYSTOLIC BLOOD PRESSURE: 116 MMHG

## 2017-04-22 VITALS — DIASTOLIC BLOOD PRESSURE: 51 MMHG | SYSTOLIC BLOOD PRESSURE: 122 MMHG

## 2017-04-22 VITALS — DIASTOLIC BLOOD PRESSURE: 55 MMHG | SYSTOLIC BLOOD PRESSURE: 104 MMHG

## 2017-04-22 VITALS — SYSTOLIC BLOOD PRESSURE: 126 MMHG | DIASTOLIC BLOOD PRESSURE: 59 MMHG

## 2017-04-22 VITALS — SYSTOLIC BLOOD PRESSURE: 103 MMHG | DIASTOLIC BLOOD PRESSURE: 43 MMHG

## 2017-04-22 VITALS — DIASTOLIC BLOOD PRESSURE: 52 MMHG | SYSTOLIC BLOOD PRESSURE: 119 MMHG

## 2017-04-22 VITALS — SYSTOLIC BLOOD PRESSURE: 116 MMHG | DIASTOLIC BLOOD PRESSURE: 51 MMHG

## 2017-04-22 VITALS — SYSTOLIC BLOOD PRESSURE: 116 MMHG | DIASTOLIC BLOOD PRESSURE: 46 MMHG

## 2017-04-22 VITALS — DIASTOLIC BLOOD PRESSURE: 51 MMHG | SYSTOLIC BLOOD PRESSURE: 120 MMHG

## 2017-04-22 VITALS — DIASTOLIC BLOOD PRESSURE: 51 MMHG | SYSTOLIC BLOOD PRESSURE: 114 MMHG

## 2017-04-22 VITALS — DIASTOLIC BLOOD PRESSURE: 48 MMHG | SYSTOLIC BLOOD PRESSURE: 112 MMHG

## 2017-04-22 VITALS — DIASTOLIC BLOOD PRESSURE: 63 MMHG | SYSTOLIC BLOOD PRESSURE: 135 MMHG

## 2017-04-22 VITALS — SYSTOLIC BLOOD PRESSURE: 114 MMHG | DIASTOLIC BLOOD PRESSURE: 46 MMHG

## 2017-04-22 VITALS — DIASTOLIC BLOOD PRESSURE: 44 MMHG | SYSTOLIC BLOOD PRESSURE: 102 MMHG

## 2017-04-22 VITALS — SYSTOLIC BLOOD PRESSURE: 119 MMHG | DIASTOLIC BLOOD PRESSURE: 54 MMHG

## 2017-04-22 VITALS — SYSTOLIC BLOOD PRESSURE: 112 MMHG | DIASTOLIC BLOOD PRESSURE: 48 MMHG

## 2017-04-22 VITALS — SYSTOLIC BLOOD PRESSURE: 119 MMHG | DIASTOLIC BLOOD PRESSURE: 48 MMHG

## 2017-04-22 VITALS — SYSTOLIC BLOOD PRESSURE: 119 MMHG | DIASTOLIC BLOOD PRESSURE: 50 MMHG

## 2017-04-22 VITALS — SYSTOLIC BLOOD PRESSURE: 108 MMHG | DIASTOLIC BLOOD PRESSURE: 48 MMHG

## 2017-04-22 VITALS — SYSTOLIC BLOOD PRESSURE: 129 MMHG | DIASTOLIC BLOOD PRESSURE: 49 MMHG

## 2017-04-22 VITALS — DIASTOLIC BLOOD PRESSURE: 49 MMHG | SYSTOLIC BLOOD PRESSURE: 126 MMHG

## 2017-04-22 VITALS — DIASTOLIC BLOOD PRESSURE: 53 MMHG | SYSTOLIC BLOOD PRESSURE: 114 MMHG

## 2017-04-22 VITALS — SYSTOLIC BLOOD PRESSURE: 128 MMHG | DIASTOLIC BLOOD PRESSURE: 50 MMHG

## 2017-04-22 VITALS — DIASTOLIC BLOOD PRESSURE: 47 MMHG | SYSTOLIC BLOOD PRESSURE: 115 MMHG

## 2017-04-22 VITALS — DIASTOLIC BLOOD PRESSURE: 56 MMHG | SYSTOLIC BLOOD PRESSURE: 133 MMHG

## 2017-04-22 VITALS — SYSTOLIC BLOOD PRESSURE: 123 MMHG | DIASTOLIC BLOOD PRESSURE: 50 MMHG

## 2017-04-22 VITALS — DIASTOLIC BLOOD PRESSURE: 48 MMHG | SYSTOLIC BLOOD PRESSURE: 115 MMHG

## 2017-04-22 VITALS — DIASTOLIC BLOOD PRESSURE: 56 MMHG | SYSTOLIC BLOOD PRESSURE: 131 MMHG

## 2017-04-22 VITALS — DIASTOLIC BLOOD PRESSURE: 46 MMHG | SYSTOLIC BLOOD PRESSURE: 122 MMHG

## 2017-04-22 VITALS — SYSTOLIC BLOOD PRESSURE: 113 MMHG | DIASTOLIC BLOOD PRESSURE: 49 MMHG

## 2017-04-22 VITALS — SYSTOLIC BLOOD PRESSURE: 108 MMHG | DIASTOLIC BLOOD PRESSURE: 54 MMHG

## 2017-04-22 VITALS — SYSTOLIC BLOOD PRESSURE: 112 MMHG | DIASTOLIC BLOOD PRESSURE: 50 MMHG

## 2017-04-22 VITALS — DIASTOLIC BLOOD PRESSURE: 61 MMHG | SYSTOLIC BLOOD PRESSURE: 126 MMHG

## 2017-04-22 VITALS — SYSTOLIC BLOOD PRESSURE: 122 MMHG | DIASTOLIC BLOOD PRESSURE: 47 MMHG

## 2017-04-22 VITALS — SYSTOLIC BLOOD PRESSURE: 111 MMHG | DIASTOLIC BLOOD PRESSURE: 49 MMHG

## 2017-04-22 VITALS — DIASTOLIC BLOOD PRESSURE: 46 MMHG | SYSTOLIC BLOOD PRESSURE: 108 MMHG

## 2017-04-22 VITALS — DIASTOLIC BLOOD PRESSURE: 46 MMHG | SYSTOLIC BLOOD PRESSURE: 111 MMHG

## 2017-04-22 VITALS — SYSTOLIC BLOOD PRESSURE: 115 MMHG | DIASTOLIC BLOOD PRESSURE: 51 MMHG

## 2017-04-22 VITALS — DIASTOLIC BLOOD PRESSURE: 46 MMHG | SYSTOLIC BLOOD PRESSURE: 110 MMHG

## 2017-04-22 VITALS — DIASTOLIC BLOOD PRESSURE: 48 MMHG | SYSTOLIC BLOOD PRESSURE: 114 MMHG

## 2017-04-22 VITALS — SYSTOLIC BLOOD PRESSURE: 134 MMHG | DIASTOLIC BLOOD PRESSURE: 57 MMHG

## 2017-04-22 LAB
ALBUMIN SERPL-MCNC: 3.4 G/DL (ref 3.4–5)
ALP SERPL-CCNC: 19 U/L (ref 46–116)
ALT SERPL-CCNC: 34 U/L (ref 10–68)
ANION GAP SERPL CALC-SCNC: 11.6 MMOL/L (ref 8–16)
BILIRUB SERPL-MCNC: 0.48 MG/DL (ref 0.2–1.3)
BUN SERPL-MCNC: 12 MG/DL (ref 7–18)
CALCIUM SERPL-MCNC: 8.1 MG/DL (ref 8.5–10.1)
CHLORIDE SERPL-SCNC: 111 MMOL/L (ref 98–107)
CO2 SERPL-SCNC: 30.7 MMOL/L (ref 21–32)
CREAT SERPL-MCNC: 0.9 MG/DL (ref 0.6–1.3)
ERYTHROCYTE [DISTWIDTH] IN BLOOD BY AUTOMATED COUNT: 13.7 % (ref 11.5–14.5)
GLOBULIN SER-MCNC: 1.7 G/L
GLUCOSE SERPL-MCNC: 136 MG/DL (ref 74–106)
HCT VFR BLD CALC: 26 % (ref 42–54)
HGB BLD-MCNC: 8.6 G/DL (ref 13.5–17.5)
MCH RBC QN AUTO: 32.2 PG (ref 26–34)
MCHC RBC AUTO-ENTMCNC: 33.1 G/DL (ref 31–37)
MCV RBC: 97.4 FL (ref 80–100)
OSMOLALITY SERPL CALC.SUM OF ELEC: 297 MOSM/KG (ref 275–300)
PLATELET # BLD: 75 10X3/UL (ref 130–400)
PMV BLD AUTO: 8.9 FL (ref 7.4–10.4)
POTASSIUM SERPL-SCNC: 4.3 MMOL/L (ref 3.5–5.1)
PROT SERPL-MCNC: 5.1 G/DL (ref 6.4–8.2)
RBC # BLD AUTO: 2.67 10X6/UL (ref 4.2–6.1)
SODIUM SERPL-SCNC: 149 MMOL/L (ref 136–145)
WBC # BLD AUTO: 6.1 10X3/UL (ref 4.8–10.8)

## 2017-04-22 NOTE — NUR
REASSESSMENT COMPLETED. SEE FLOWSHEET FOR ALL FINDINGS. RESTING.AOX4. PERRLA
NOTED. MAEW. FOLLOWS COMMANDS. ABLE TO EXPRESS NEEDS. SPPECH IS CLEAR. RESP
EVEN AND UNLABORED. SPO2 98% ON O2 AT 4 LPM NC. LUNGS CTA. OCC PROD COUGH
NOTED. AV PACED ON THE MONITOR. TEMP PM INTACT AT . PULSES PALP. A LINE
INTACT WITH GOOD WAVE FORM R IJ SWAN PATENT, LOCKED, AND SECURED. MEDIASTINAL
CHEST TUBES INTACT TO 20 SM SX. MINIMAL BLOODY DRNG SEEN. PCA MORPHINE IN USE
FOR PAIN CONTROL. TURNED AND REPOSITIONED. TAKING ICE CHIPS. ABD SOFT, BSA X4.
F/C PATENT WITH GABRIEL UOP TO CRITICORE. HOB UP. REMAINS IN 1:1 NURSING CARE.
CONT CURRENT POC.

## 2017-04-22 NOTE — NUR
ABGS DRAWN AND RESULTED. K+ LEVEL TREATED. ALL OTHER VALUES WITHIN PARAMETERS.
RIGHT LEG DRESSING CHANGED PER ORDERS. VSS. HOB UP. CONT POC.

## 2017-04-22 NOTE — NUR
REPOSITIONED FOR COMFORT. PO FLUIDS PROVIDED. VSS. PCA MORPHINE IN REACH. HOB
UP. 1:1 NURSE OBSERVATION IN PLACE. CONT CURRENT POC.

## 2017-04-22 NOTE — OP
PATIENT NAME:  MARINA ASHFORD                           MEDICAL RECORD: [id number]
:46                                             LOCATION:[facility]    D.CV07
                                                         ADMISSION DATE:17
SURGEON:  LEWIS GILLIAM MD             
 
 
DATE OF OPERATION:  2017
 
SURGEON:  Lewis Gilliam MD
 
ANESTHESIA:  General endotracheal, Dr. Barry.
 
OPERATIONS PERFORMED:  Aortocoronary artery bypass utilizing left internal
thoracic, left anterior descending, reverse saphenous vein to the distal right
coronary artery and reverse saphenous vein graft to the obtuse marginal coronary
artery.
 
PREOPERATIVE DIAGNOSES:  Angina pectoris and severe occlusive coronary artery
disease.
 
POSTOPERATIVE DIAGNOSES:  Angina pectoris and severe occlusive coronary artery
disease.
 
INDICATION FOR OPERATION:  Angina pectoris.
 
FINDINGS AT OPERATION:  The vein was of good quality from the right thigh.  The
left internal thoracic was an excellent conduit.  The target vessels left
anterior descending, obtuse marginal and distal right coronary artery were
graftable vessels.  The distal circumflex or obtuse marginal 2 and the posterior
descending were not visible.
 
ESTIMATED BLOOD LOSS:  Cell Saver was used.
 
DESCRIPTION OF PROCEDURE:  After informed consent, adequate preoperative
medication evaluation, the patient was brought to the operating room, placed on
the table in the supine position.  After induction of general endotracheal
anesthesia and application of appropriate monitoring devices, the patient was
prepped and draped in a sterile field, utilizing Betadine scrub, alcohol, and
Betadine solution.  A Betadine-impregnated drape was also used.  Saphenous vein
was harvested from right leg and prepared for reverse saphenous vein grafting. 
The leg was closed over drains utilizing 3-0 Vicryl and skin staples.  A median
sternotomy incision was used and dissection carried down the fascia.  Hemostasis
maintained with electrocautery.  Sternum was divided.  Innominate vein was
identified and protected.  Left internal thoracic was taken down and prepared
for grafting.  The patient was given a calculated dose of heparin, cannulated in
the standard fashion utilizing 1 aortic, 1 two-stage cannula in the atrium and
inferior vena cava.  The patient was placed on cardiopulmonary bypass, cooled to
32 degrees centigrade.  A cross clamp was placed just proximal to the aortic
cannula and the patient was given cardioplegic solution through the aortic root.
 The patient was given a cold induction and cold maintenance.  The patient was
given cold intermittent cardioplegic solution throughout the procedure, either
through the root, through the grafts or a combination of both.  The first vessel
to be grafted was the distal right coronary artery.  The posterior descending
was not seen; however, dissecting the grooved, the bifurcation and posterior
descending and posterolateral branches of the right were identified.  The
anastomosis was made at the takeoff of the posterior descending end-to-side
utilizing a running 7-0 Prolene suture.  Grafts were measured back to the aorta
and a proximal anastomosis fashioned utilizing running 6-0 Prolene suture. 
 
 
 
OPERATIVE REPORT                               [id number]    MARINA ASHFORD         
 
 
Next, the obtuse marginal was grafted end-to-side utilizing a running 7-0
Prolene suture.  Grafts were measured back to the aorta and a proximal
anastomosis fashioned utilizing running 6-0 Prolene suture.  Next, left internal
thoracic was brought through the hole in pericardium, sutured left anterior
descending end-to-side utilizing a running 8-0 Prolene suture.  Pedicle was
attached to epicardium with a 7-0 Prolene suture.  The patient was given warm
cardioplegic reperfusion and controlled reperfusion.  All maneuvers to remove
trapped air from the heart was performed.  The cross clamp was removed and the
patient rewarmed to 37 degrees centigrade.  Two atrial and 2 ventricular pacing
wires were placed on the heart and brought through the epigastric area.  The
patient was weaned cardiopulmonary bypass.  After being stable off bypass, he
was given calculated dose of protamine to reverse the heparin.  Chest and
mediastinum irrigated with copious amounts of antibiotic solution and normal
saline.  Hemostasis was assured.  The instrument counts and sponge counts were
correct times 2.  A #40 right angle and #36 chest tubes were brought in through
the epigastric area and placed in mediastinum.  A separate left pleural tube was
connected to underwater seal and suction.  Chest was again irrigated. 
Instrument count and sponge count were correct times 2.  Chest was closed in
layers utilizing #7 wire on the sternum, #2 Vicryl on linea alba and pectoralis
fascia.  Subcutaneous tissue was approximated with 3-0 Vicryl and skin
approximated with 3-0 subcuticular Vicryl.  Sterile dressings were applied.  The
patient tolerated the procedure well and transferred to cardiovascular recovery
in satisfactory condition.
 
TRANSINT:[id number] Voice Confirmation ID: [id number] DOCUMENT ID: [id number]
                                           
                                           LEWIS GILLIAM MD             
 
 
 
Electronically Signed by LEWIS GILLIAM on 17 at 1223
 
 
 
 
 
 
 
 
 
 
 
 
 
 
 
 
CC:                                                             [id number]
DICTATION DATE: 17 1612     :     17 9925      ADM IN  
                                                                              
[facility]                                          
[address] [id number]

## 2017-04-22 NOTE — NUR
TPM SETTINGS CHANGED TO VVI 60 PER DR. ALEJANDRO.  IV FLUID WEANING IN PROCESS.
WEANING DOPAMINE DOWN.

## 2017-04-22 NOTE — NUR
ASSISTED TO BEDPAN. PASSED FLATUS ONLY. C/O NAUSEA. PRN ZOFRAN PROVIDED.
EFFECTIVE. NO EMESIS. HS MEDS GIVEN. NO VISITORS. REPOSITIONED FOR COMFORT.
PCA MORPHIN EPROVIDING PAIN CONTROL. RESTING SOUNDLY. HOB UP. CONT 1:1 NURSING
CARE.

## 2017-04-22 NOTE — HP
PATIENT: MARINA ASHFORD                                 MEDICAL RECORD: [id number]
ACCOUNT: [id number]                                    LOCATION:[place] [address]
: 46                                            ADMISSION DATE: 17
                                                         
 
                             HISTORY AND PHYSICAL EXAMINATION
 
 
NameMARINA ASHFORD (65yo, M) ID# [id number]. Date/Time2017
11:37IDISS1948Service Dept.[facility]
ClinicProviderEDJAVAN ALEJANDRO MDInsuranceMed Primary: [place] (MEDICARE)
Insurance # : [id number]
Referring Provider Name : DOREEN BATRES
Employer Name : UNKNOWN
Med Secondary: [employer] (POS)
Insurance # : [id number]
Policy/Group # : PSPRIM
Referring Provider Name : DOREEN BATRES
Employer Name : UNKNOWN
Prescription: Chief Complaint
Coronary artery disease
Patient's Care Team
Referring Provider ():  DOREEN BATRES: [address] [place] [address], Ph [phone], Fax [phone] 
Cardiologist:  J CARLOS SCHUMACHER MD: [address] [place] [id number], Ph [phone], Fax [phone] NPI: [id number]
Patient's Pharmacies
[id number]  (ERX): [facility] [address] [address], Ph
[phone], Fax [phone]
Vitals
BP:160/90 sitting R arm 2017 11:42 am
150/80 sitting L arm 2017 11:42 amHR:66R/R 2017 11:42 amHt:6 ft
2017 11:42 amWt:235 lbs 2017 11:40 amBMI:31.9 2017 11:42
amAllergies
Reviewed Allergies
NKDAMedications
Reviewed Medications
allopurinol 300 mg jxgrlb08/07/17   filledPRESCRIPTION SOLUTIONSazithromycin 250 mg
sflfyq56/06/17   filledPRESCRIPTION SOLUTIONSfluticasone 50 mcg/actuation nasal
spray,lezhofmgba82/17/17   filledPRESCRIPTION SOLUTIONSglipiZIDE 5 mg vlbpoi54/19/16
  filledPRESCRIPTION SOLUTIONSketoconazole 2 % topical cream16  
filledPRESCRIPTION SOLUTIONSLantus Solostar 100 unit/mL (3 mL) subcutaneous insulin
pen17   filledPRESCRIPTION SOLUTIONSlisinopril 10 mg sgmjxq09/02/17  
filledPRESCRIPTION SOLUTIONSmeloxicam 15 mg dksxec82/02/17   filledPRESCRIPTION
SOLUTIONSmetFORMIN 1,000 mg fffndu95/02/17   filledPRESCRIPTION SOLUTIONSsimvastatin
40 mg nbcmvd23/02/17   filledPRESCRIPTION SOLUTIONSSuprep Bowel Prep Kit 17.5
gram-3.13 gram-1.6 gram oral ktmoqoug87/22/17   filledPRESCRIPTION
SOLUTIONSterbinafine HCl 250 mg uhdbla46/01/16   filledPRESCRIPTION
SOLUTIONSProblems
Reviewed Problems
 
Coronary arteriosclerosis - Onset: 2017
Family History
Discussed Family History
 
Father- Heart diseasePaternal Grandfather- Heart diseaseSocial History
Discussed Social History
Cardiology
 
 
 
HISTORY AND PHYSICAL                           [id number]    MARINA ASHFORD         
 
 
Family history of heart disease?: Y
Smoking Status: Never smoker
High Cholesterol: Y
High blood pressure: Y
Diabetes: Y
Surgical History
Reviewed Surgical History
 
Other - Right shoulder surgery
Other - right knee surgery
Other - left wrist
Past Medical History
Discussed Past Medical History
Angina: Y
Coronary Artery Disease: Y
Diabetes: Y
Hyperlipidemia: Y
Hypertension: Y
Shortness of Breath: Y
Notes: Arthritis/Gout
Documents for Discussion
N/A
Screening
None recorded.
HPI
Coronary Artery Disease F/U
Reported by patient.
Severity: chest discomfort with household activities/yard work
Context: previous imaging studies 
Associated Symptoms: chest pain with exertion; dyspnea with exertion
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
ROS
Patient reports exercise intolerance  but reports no fever, no night sweats, no
significant weight gain, and no significant weight loss. He reports muscle aches and
arthralgias/joint pain but reports no muscle weakness, no back pain, and no swelling
in the extre mities. He reports no dry eyes, no irritation, and no vision change. He
reports no difficulty hearing and no ear pain. He reports no frequent nosebleeds and
no nose/sinus problems. He reports no sore throat, no bleeding gums, no snoring, no
dry mouth, no m outh ulcers, no oral abnormalities, and no teeth problems. He
reports no jugular vein distension and no swollen glands. He reports no chest pain,
no arm pain on exertion, no shortness of breath when walking, no shortness of breath
when lying down, no palp i tations, and no known heart murmur. He reports no cough,
no wheezing, no shortness of breath, and no coughing up blood. He reports no
abdominal pain, no vomiting, normal appetite, no diarrhea, not vomiting blood, no
nausea, and no constipation. He reports  no incontinence, no difficulty urinating,
no hematuria, and no increased frequency. He reports no abnormal mole, no jaundice,
and no rashes. He reports no loss of consciousness, no weakness, no numbness, no
seizures, no dizziness, and no headaches. He rep o rts no depression, no sleep
disturbances, feeling safe in relationship, and no alcohol abuse. He reports no
fatigue. He reports no swollen glands and no bruising. He reports no runny nose, no
sinus pressure, no itching, no hives, and no frequent sneezing. 
ROS as noted in the HPI
Physical Exam
Patient is a 66-year-old male.
 
 
 
 
HISTORY AND PHYSICAL                           [id number]    MARINA ASHFORD         
 
 
Constitutional: General Appearance well nourished and developed and
healthy-appearing. Level of Distress NAD. Ambulation ambulating normally.
 
Cardiovascular: Apical Impulse not displac ed or no thrill. Heart Auscultation
normal s1 and s2; no murmurs, rubs, or gallops; and RRR. Arterial Pulses no
abdominal aorta bruits, femoral bruits, or popliteal bruits and 2+ bilateral,
carotid 2+ bilateral, femoral 2+ bilateral, popliteal 2+ bilatera l, and dorsalis
pedis 2+ bilateral. Edema no edema or varicosities.
 
Lungs:  Repiratory Effort no dyspnea. Percussion no hyperresonance or dullness or
flatness. Auscultation no wheezing, rhonchi, or rales / crackles and breathing
sounds normal, good air movement, and CTA except as noted.
 
Abdomen:  Bowl Sounds normal. Inspection and Palpation no tenderness, guarding,
masses, or rebound tenderness and soft and non-distended. Liver non-tender and no
hepatomegaly. Spleen non-tender and no splenomegaly. Hernia none palpable.
 
Musculoskeletal System: Gait And Stance normal gait and stance. Digits and Nails
normal nails and no cyanosis.
 
Neurologic: Cranial Nerves grossly intact. Reflexes DTRs 2+ bilaterally throughout.
Sensation grossly intact.
 
Lymph Nodes: Lymph Nodes no cervical LAD, supraclavicular LAD, axillary LAD, or
inguinal LAD.
 
Eyes: Lids and Conjunctivae no discharge or pallor and non-injected. Pupils PERRLA.
Cornea grossly intact. EOM EOMI. Lens clear. Sclerae non-icteric.
 
Neck: Neck no masses, enlarged lymph nodes, or carotid bruits and supple and trachea
midline. Thyroid no enlargement or nodules and non-tender.
 
Skin: Inspection and Palpation no rash, lesions, ulcers, jaundice, or abnormal nevi.
Assessment / Plan
occlusive coronary artery disease with angina pectoris 
 
1. Coronary arteriosclerosis
I25.10: Atherosclerotic heart disease of native coronary artery without angina
pectoris
 
Discussion Notes
I agree with Dr. Schumacher the patient would benefit from coronary artery bypass. I 
have discussed his disease process with him in detail as well as the alternative
methods of treatment. We discussed coronary artery bypass and the expected benefits
and risk which include bleeding, infection, stroke, loss of limb, and death. He
understan ds all of the above and wishes to proceed with planned procedure.
 
Return to Office
Mitesh Alejandro MD for Surgery at [facility]_SURGERY SCHEDULE on 2017 at 07:30 AM
 
 
 
 
 
HISTORY AND PHYSICAL                           [id number]    MARINA ASHFORD EDWARD MD             
 
 
 
Electronically Signed by MITESH ALEJANDRO on 17 at 1223
 
 
 
 
 
 
 
 
 
 
 
 
 
 
 
 
 
 
 
 
 
 
 
 
 
 
 
 
 
 
 
 
 
 
 
 
 
 
 
 
 
CC:                                                             [phone]
DICTATION DATE: 17 1110     : VINNIE  17 0950      ADM IN  
                                                                              
[facility]                                          
[address] [place] [address]

## 2017-04-22 NOTE — NUR
REPORT RECVD. CARE ASSUMED. INITIAL ASSMNT COMPLETED. SEE FLOWSHEET FOR ALL
FINDINGS. RESTING SOUNDLY WITH NO DISTRESS. SPO2 96%. LUNGS CTA. DIM IN BASES.
AOX4. FOLLOWS COMMANDS. MAEW. SR WITH FREQ PACS ON THE MONITOR.
TEMP PM INTACT AT VVI 60, SENSING ONLY.
PULSES PALP. RIGHT RADIAL A-LINE ZEROED AND
BALANCED. GOOD WAVE FORM SEEN. SYS B/P WITHIN PARAMETERS.
RIGHT IJ SWAN INTACT AT 52CM. PATENT, LOCKED, AND
SECURED. STERNAL DRESSINGS CDI. ANTERIOR, POSTERIOR AND LEFT MEDIASTINAL CHEST
TUBES PATENT AND INTACT TO 20 SM SX. MINIMAL BLOODY DRNG SEEN. NO AIR LEAKS
NOTED. AFEBRILE. ABD DISTENDED. BS HYO X4. PASSING FLATUS. F/C PATENT TO
CRITICORE WITH GABRIEL UOP. TEDS/SCDS IN USE.  REPOSITIONED
FOR COMFORT AND SKIN INTEGRITY. HOB UP. 1:1 NURSING CARE IN PLACE. CONT
CURRENT POC.

## 2017-04-22 NOTE — NUR
REASSESSMENT COMPLETED. SEE FLOWSHEET FOR ALL FINDINGS. RESTING.AOX4. PERRLA
NOTED. MAEW. FOLLOWS COMMANDS. ABLE TO EXPRESS NEEDS. SPPECH IS CLEAR. RESP
EVEN AND UNLABORED. SPO2 96% ON O2 AT 3LPM NC. LUNGS CTA. OCC PROD COUGH
NOTED. SR-ST WITH FREQ PACS SEEN. PULSES PALP. A LINE
INTACT WITH GOOD WAVE FORM R IJ SWAN PATENT, LOCKED, AND SECURED. MEDIASTINAL
CHEST TUBES INTACT TO 20 SM SX. MINIMAL BLOODY DRNG SEEN. PCA MORPHINE IN USE
FOR PAIN CONTROL. TURNED AND REPOSITIONED. ABD DISTENDED WITH HYPO BS X4.
PASSING FLATUS NO DIFF. NO C/O N/V.
F/C PATENT WITH GABRIEL UOP TO CRITICORE. HOB UP. REMAINS IN 1:1 NURSE
MONITORING. CONT CURRENT POC.

## 2017-04-22 NOTE — NUR
BED BATH AND LINEN CHANGE COMPLETED.  TURNED REPOSITIONED.  DEEP BREATHING AND
COUGHING DONE.  I.S. PER R.T.

## 2017-04-22 NOTE — NUR
ABGS DRAWN AND RESULTED. K+ LEVEL TREATED. INSULIN GTT STOPPED AT THIS TIME.
NO S/S OF HYPOGLYCEMIA. AOX4. DENIES NEEDS. PCA IN REACH. CONT CURRENT POC.

## 2017-04-23 VITALS — DIASTOLIC BLOOD PRESSURE: 52 MMHG | SYSTOLIC BLOOD PRESSURE: 119 MMHG

## 2017-04-23 VITALS — DIASTOLIC BLOOD PRESSURE: 50 MMHG | SYSTOLIC BLOOD PRESSURE: 117 MMHG

## 2017-04-23 VITALS — DIASTOLIC BLOOD PRESSURE: 56 MMHG | SYSTOLIC BLOOD PRESSURE: 133 MMHG

## 2017-04-23 VITALS — DIASTOLIC BLOOD PRESSURE: 54 MMHG | SYSTOLIC BLOOD PRESSURE: 105 MMHG

## 2017-04-23 VITALS — SYSTOLIC BLOOD PRESSURE: 114 MMHG | DIASTOLIC BLOOD PRESSURE: 40 MMHG

## 2017-04-23 VITALS — SYSTOLIC BLOOD PRESSURE: 113 MMHG | DIASTOLIC BLOOD PRESSURE: 43 MMHG

## 2017-04-23 VITALS — DIASTOLIC BLOOD PRESSURE: 60 MMHG | SYSTOLIC BLOOD PRESSURE: 118 MMHG

## 2017-04-23 VITALS — SYSTOLIC BLOOD PRESSURE: 120 MMHG | DIASTOLIC BLOOD PRESSURE: 52 MMHG

## 2017-04-23 VITALS — DIASTOLIC BLOOD PRESSURE: 54 MMHG | SYSTOLIC BLOOD PRESSURE: 110 MMHG

## 2017-04-23 VITALS — SYSTOLIC BLOOD PRESSURE: 125 MMHG | DIASTOLIC BLOOD PRESSURE: 60 MMHG

## 2017-04-23 VITALS — DIASTOLIC BLOOD PRESSURE: 45 MMHG | SYSTOLIC BLOOD PRESSURE: 107 MMHG

## 2017-04-23 VITALS — DIASTOLIC BLOOD PRESSURE: 53 MMHG | SYSTOLIC BLOOD PRESSURE: 122 MMHG

## 2017-04-23 VITALS — SYSTOLIC BLOOD PRESSURE: 114 MMHG | DIASTOLIC BLOOD PRESSURE: 45 MMHG

## 2017-04-23 VITALS — DIASTOLIC BLOOD PRESSURE: 52 MMHG | SYSTOLIC BLOOD PRESSURE: 100 MMHG

## 2017-04-23 VITALS — DIASTOLIC BLOOD PRESSURE: 57 MMHG | SYSTOLIC BLOOD PRESSURE: 128 MMHG

## 2017-04-23 VITALS — SYSTOLIC BLOOD PRESSURE: 116 MMHG | DIASTOLIC BLOOD PRESSURE: 50 MMHG

## 2017-04-23 VITALS — SYSTOLIC BLOOD PRESSURE: 134 MMHG | DIASTOLIC BLOOD PRESSURE: 63 MMHG

## 2017-04-23 VITALS — SYSTOLIC BLOOD PRESSURE: 129 MMHG | DIASTOLIC BLOOD PRESSURE: 54 MMHG

## 2017-04-23 VITALS — SYSTOLIC BLOOD PRESSURE: 126 MMHG | DIASTOLIC BLOOD PRESSURE: 52 MMHG

## 2017-04-23 VITALS — DIASTOLIC BLOOD PRESSURE: 54 MMHG | SYSTOLIC BLOOD PRESSURE: 128 MMHG

## 2017-04-23 VITALS — DIASTOLIC BLOOD PRESSURE: 52 MMHG | SYSTOLIC BLOOD PRESSURE: 120 MMHG

## 2017-04-23 VITALS — DIASTOLIC BLOOD PRESSURE: 52 MMHG | SYSTOLIC BLOOD PRESSURE: 123 MMHG

## 2017-04-23 VITALS — SYSTOLIC BLOOD PRESSURE: 112 MMHG | DIASTOLIC BLOOD PRESSURE: 55 MMHG

## 2017-04-23 VITALS — DIASTOLIC BLOOD PRESSURE: 51 MMHG | SYSTOLIC BLOOD PRESSURE: 121 MMHG

## 2017-04-23 VITALS — DIASTOLIC BLOOD PRESSURE: 44 MMHG | SYSTOLIC BLOOD PRESSURE: 94 MMHG

## 2017-04-23 VITALS — DIASTOLIC BLOOD PRESSURE: 56 MMHG | SYSTOLIC BLOOD PRESSURE: 130 MMHG

## 2017-04-23 VITALS — DIASTOLIC BLOOD PRESSURE: 52 MMHG | SYSTOLIC BLOOD PRESSURE: 133 MMHG

## 2017-04-23 VITALS — SYSTOLIC BLOOD PRESSURE: 118 MMHG | DIASTOLIC BLOOD PRESSURE: 49 MMHG

## 2017-04-23 VITALS — DIASTOLIC BLOOD PRESSURE: 54 MMHG | SYSTOLIC BLOOD PRESSURE: 107 MMHG

## 2017-04-23 VITALS — DIASTOLIC BLOOD PRESSURE: 56 MMHG | SYSTOLIC BLOOD PRESSURE: 115 MMHG

## 2017-04-23 VITALS — DIASTOLIC BLOOD PRESSURE: 54 MMHG | SYSTOLIC BLOOD PRESSURE: 114 MMHG

## 2017-04-23 VITALS — DIASTOLIC BLOOD PRESSURE: 40 MMHG | SYSTOLIC BLOOD PRESSURE: 115 MMHG

## 2017-04-23 VITALS — DIASTOLIC BLOOD PRESSURE: 56 MMHG | SYSTOLIC BLOOD PRESSURE: 114 MMHG

## 2017-04-23 VITALS — DIASTOLIC BLOOD PRESSURE: 47 MMHG | SYSTOLIC BLOOD PRESSURE: 106 MMHG

## 2017-04-23 VITALS — SYSTOLIC BLOOD PRESSURE: 122 MMHG | DIASTOLIC BLOOD PRESSURE: 53 MMHG

## 2017-04-23 VITALS — SYSTOLIC BLOOD PRESSURE: 123 MMHG | DIASTOLIC BLOOD PRESSURE: 52 MMHG

## 2017-04-23 VITALS — DIASTOLIC BLOOD PRESSURE: 40 MMHG | SYSTOLIC BLOOD PRESSURE: 110 MMHG

## 2017-04-23 VITALS — DIASTOLIC BLOOD PRESSURE: 48 MMHG | SYSTOLIC BLOOD PRESSURE: 113 MMHG

## 2017-04-23 VITALS — DIASTOLIC BLOOD PRESSURE: 54 MMHG | SYSTOLIC BLOOD PRESSURE: 109 MMHG

## 2017-04-23 VITALS — SYSTOLIC BLOOD PRESSURE: 121 MMHG | DIASTOLIC BLOOD PRESSURE: 50 MMHG

## 2017-04-23 VITALS — DIASTOLIC BLOOD PRESSURE: 56 MMHG | SYSTOLIC BLOOD PRESSURE: 116 MMHG

## 2017-04-23 VITALS — DIASTOLIC BLOOD PRESSURE: 52 MMHG | SYSTOLIC BLOOD PRESSURE: 114 MMHG

## 2017-04-23 VITALS — SYSTOLIC BLOOD PRESSURE: 108 MMHG | DIASTOLIC BLOOD PRESSURE: 51 MMHG

## 2017-04-23 LAB
ALBUMIN SERPL-MCNC: 2.8 G/DL (ref 3.4–5)
ALP SERPL-CCNC: 28 U/L (ref 46–116)
ALT SERPL-CCNC: 29 U/L (ref 10–68)
ANION GAP SERPL CALC-SCNC: 12.4 MMOL/L (ref 8–16)
BILIRUB SERPL-MCNC: 0.45 MG/DL (ref 0.2–1.3)
BUN SERPL-MCNC: 24 MG/DL (ref 7–18)
CALCIUM SERPL-MCNC: 7.9 MG/DL (ref 8.5–10.1)
CHLORIDE SERPL-SCNC: 104 MMOL/L (ref 98–107)
CO2 SERPL-SCNC: 29.2 MMOL/L (ref 21–32)
CREAT SERPL-MCNC: 1.4 MG/DL (ref 0.6–1.3)
ERYTHROCYTE [DISTWIDTH] IN BLOOD BY AUTOMATED COUNT: 13.9 % (ref 11.5–14.5)
GLOBULIN SER-MCNC: 2.6 G/L
GLUCOSE SERPL-MCNC: 144 MG/DL (ref 74–106)
HCT VFR BLD CALC: 26.2 % (ref 42–54)
HGB BLD-MCNC: 8.6 G/DL (ref 13.5–17.5)
MCH RBC QN AUTO: 32.5 PG (ref 26–34)
MCHC RBC AUTO-ENTMCNC: 32.8 G/DL (ref 31–37)
MCV RBC: 98.9 FL (ref 80–100)
OSMOLALITY SERPL CALC.SUM OF ELEC: 287 MOSM/KG (ref 275–300)
PLATELET # BLD: 101 10X3/UL (ref 130–400)
PMV BLD AUTO: 9.6 FL (ref 7.4–10.4)
POTASSIUM SERPL-SCNC: 4.6 MMOL/L (ref 3.5–5.1)
PROT SERPL-MCNC: 5.4 G/DL (ref 6.4–8.2)
RBC # BLD AUTO: 2.65 10X6/UL (ref 4.2–6.1)
SODIUM SERPL-SCNC: 141 MMOL/L (ref 136–145)
WBC # BLD AUTO: 9.2 10X3/UL (ref 4.8–10.8)

## 2017-04-23 NOTE — NUR
DR. ALEJANDRO AT BEDSIDE TO REMOVE SWAN AND CHEST TUBES.  THA DRAINS, AND A-LINE
DC'D PER ORDER.  MANUAL PRESSURE APPLIED TO RIGHT RADIAL AND RIGHT JUGULAR
TIMES 5 MINUTES.  CLEAR DRESSINGS APPLIED.  BED BATH AND LINEN CHANGE
COMPLETED.  LEG AND TPM DRESSINGS CHANGED.  PT NOW STATING THAT "THOSE PEOPLE
OUT THERE ARE TRYING TO GET MY LEADS!  IF YOU LOOK IN YOUR LIST YOU WILL SEE
MR. THURSTON.  HE'S THE ONE TRYING TO STEAL MY HEART.  THATS WHY THEY MADE ME
HAVE HEART SURGERY!"  PT VERY ADAMENT THAT THERE ARE PEOPLE IN THE HALLWAY
TRYING TO "TAKE LEADS" OUT OF HIS KNEE AND THAT THEY ARE "LISTENING" TO HIS
CONVERSATIONS AND "PLOTTING AGAINST" HIM.  PT REASSURED THAT HE IS SAFE AND IN
THE HOSPITAL.

## 2017-04-23 NOTE — NUR
HS MEDS GIVEN. VOIDED TO URINAL. MODERATE ASSIST TO BED FROM CHAIR. POSITONED
FOR COMFORT. VSS. PRN NORCO GIVEN FOR COMFORT AND TO PROMOTE REST. HOB UP. C/L
AND PO FLUIDS IN REACH. BED ALARM ON. CONT CURRENT POC.

## 2017-04-23 NOTE — NUR
REASSESSMENT COMPLETED. SEE FLOWSHEET FOR ALL FINDINGS.
 RESTING SOUNDLY WITH NO DISTRESS. SPO2 96%. LUNGS CTA. DIM IN BASES.
 AWAKE AND ALERT WITH VOCAL STIM. MILD CONFUSION SEEN.
 FOLLOWS COMMANDS. MAEW. SR WITH OCC PACS ON THE MONITOR.
 TEMP PM INTACT AT VVI 60, SENSING ONLY.
 PULSES PALP. TEDS/SCDS ON. GENERALIZED EDEMA NOTED.
 SYS B/P WITHIN PARAMETERS. AFEBRILE.
 ABD DISTENDED. BS HYO X4. PASSING FLATUS.
 BLADDER NON PALP. DENIE URGE TO VOID. URINAL IN REACH. DENIES DISCOMFORT. PO
 FLUIDS AT BEDSIDE. C/L IN REACH. CONT CURRENT POC.

## 2017-04-23 NOTE — CN
PATIENT NAME:MARINA ASHFORD                               MEDICAL RECORD: [id number]
: 46                                              LOCATION:[place]
ADMIT DATE: 17                                       ACCOUNT: [id number]
CONSULTING PHYSICIAN:    GODFREY GARCES MD           
                                               
REFERRING PHYSICIAN:     LEWIS ALEJANDRO MD             
 
 
DATE OF CONSULTATION:  2017
 
Medical Consultation
 
HISTORY OF PRESENT ILLNESS:  This is a very pleasant 66-year-old gentleman, who
underwent coronary bypass surgery and consultation has been requested for
medical management.  The patient is presently resting comfortably in the ICU
postoperatively.  He is off the ventilator and is able to answer questions
appropriately.  Pain is controlled at the present time.
 
PAST MEDICAL HISTORY:  Significant for coronary artery disease, type 2 diabetes
mellitus, hyperlipidemia, dermatitis, gout, allergies, and osteoarthritis.
 
PAST SURGICAL HISTORY:  Includes right shoulder surgery, right knee surgery,
left wrist surgery.  The patient also has history of acid reflux, colon polyps.
 
MEDICATIONS:  Listed on the MAR sheet.
 
SOCIAL HISTORY:  The patient does not use alcohol at the present time or
tobacco.
 
REVIEW OF SYSTEMS:  Indicates no fever or chills.  Pain is presently controlled.
 He does have some allergies symptoms.
 
PHYSICAL EXAMINATION:
VITAL SIGNS:  At time of consultation, as above.
GENERAL:  He is a well-developed, well-nourished, very pleasant 66-year-old
gentleman that is resting comfortably postoperatively, off of ventilator.  Has 2
chest tubes that are present as well as Bergeron, pain is controlled with PCA.
HEENT:  Pupils are equal, round, and reactive to light.  Extraocular movements
are intact.  Oral cavity and oropharynx otherwise clear, clean midline bandage,
scar is present.  No nuchal rigidity.
LUNGS:  Have rales heard in the bases, otherwise clear.
HEART:  Regular rate and rhythm with a I/VI systolic ejection murmur.
ABDOMEN:  Distended, soft, nontender, positive bowel sounds.  No guarding.
EXTREMITIES:  Has a palpable DP pulse on the right side and a clean bandage is
present.  Sensation is present in both lower extremities.
NEUROLOGIC:  He is able to move all 4 extremities.
 
ASSESSMENT:
1.  Status post coronary artery disease.
2.  Type 2 diabetes mellitus.
3.  Osteoarthritis.
4.  Status post CABG.
5.  Allergies.
 
DISPOSITION:  The patient will be monitored postoperatively.  We will check
laboratory appropriately.  Follow up with the CV surgeon.
 
Thank you for the consultation.
 
 
 
CONSULT REPORT                                 [id number]    MARINA ASHFORD             
 
 
 
Saline nose spray as needed.
 
TRANSINT:[id number] Voice Confirmation ID: [id number] DOCUMENT ID: [id number]
                                           
                                           GODFREY GARCES MD           
 
 
 
Electronically Signed by GODFREY GARCES on 17 at 0932
 
 
 
 
 
 
 
 
 
 
 
 
 
 
 
 
 
 
 
 
 
 
 
 
 
 
 
 
 
 
 
 
 
 
 
 
 
CC:                                                             [id number]
DICTATION DATE: 17 1147     :     17 0115      ADM IN  
                                                                              
[facility]                                          
[address] [place] [address]

## 2017-04-23 NOTE — NUR
REPORT RECVD. CARE ASSUMED. INITIAL ASSMNT COMPLETED. SEE FLOWSHEET FOR ALL
FINDINGS. UP IN CHAIR AT BEDSIDE.
RESTING SOUNDLY WITH NO DISTRESS. SPO2 96%. LUNGS CTA. DIM IN BASES.
AWAKE AND ALERT WITH VOCAL STIM. MILD CONFUSION SEEN.
FOLLOWS COMMANDS. MAEW. SR WITH OCC PACS ON THE MONITOR.
TEMP PM INTACT AT VVI 60, SENSING ONLY.
PULSES PALP. TEDS/SCDS ON. GENERALIZED EDEMA NOTED.
SYS B/P WITHIN PARAMETERS. AFEBRILE.
ABD DISTENDED. BS HYO X4. PASSING FLATUS.
BLADDER NON PALP. DENIE URGE TO VOID. URINAL IN REACH. DENIES DISCOMFORT. PO
FLUIDS AT BEDSIDE. C/L IN REACH. CONT CURRENT POC.

## 2017-04-23 NOTE — NUR
REASSESSMENT COMPLETED. SEE FLOWSHEET FOR ALL FINDINGS.
AROUSES TO VOICE. CONFUSED/DISORIENTED. PARANOID. RESISTS CARE. ARGUMENTATIVE.
IMPULSIVE. PERRLA NOTED. SPPECH IS CLEAR. RESP
EVEN AND UNLABORED. SPO2 96% ON O2 AT 3LPM NC. LUNGS CTA. OCC PROD COUGH
NOTED. SR-ST WITH FREQ PVCS AND PACS SEEN. PULSES PALP. A LINE
INTACT WITH GOOD WAVE FORM. R IJ SWAN PATENT, LOCKED, AND SECURED. MEDIASTINAL
CHEST TUBES INTACT TO 20 SM SX. MINIMAL BLOODY DRNG SEEN. PCA MORPHINE IN USE
FOR PAIN CONTROL. TURNED AND REPOSITIONED. ABD DISTENDED WITH HYPO BS X4.
PASSING FLATUS NO DIFF. NO C/O N/V.
F/C PATENT WITH GABRIEL UOP TO CRITICORE. HOB UP. REMAINS IN 1:1 NURSE
MONITORING. CONT CURRENT POC.

## 2017-04-23 NOTE — NUR
SON AT BEDSIDE.  UPDATE GIVEN.  PT CONTINUES TO STATE THAT "MR. THURSTON WAS
HERE"  PT ACKNOWLEDGES THAT HE KNOWS HE IS HEARING AND SEEING THINGS THAT ARE
NOT REALLY THERE.

## 2017-04-23 NOTE — NUR
AWAKE AND CONFUSED. PARANOID. ATTEMPTING TO PULL LINES AND GET OOB. REORIENTED
AND REDIRECTED. RESTING WITH EYES CLOSED. CONT CLOSE OBSERVATION.

## 2017-04-23 NOTE — NUR
TURNED AND REPOSITIONED. INCENTIVE AND COUGH/DB DONE WITH FAIR EFFORT.
CONFUSED. DISORIENTED. PULLING AT LINES AND COMBATIVE WITH NURSE. RESITING
CARE. UNABLE TO REORIENT OR REDIRECT. PER PROTOCOL, WRIST RESTRAINTS APPLIED
BILATERALLY TO MAINTAIN SAFETY OF PT AND CRITICAL CARE LINES. HOB UP. VSS.
CONT CURRENT POC.

## 2017-04-24 VITALS — DIASTOLIC BLOOD PRESSURE: 57 MMHG | SYSTOLIC BLOOD PRESSURE: 126 MMHG

## 2017-04-24 VITALS — SYSTOLIC BLOOD PRESSURE: 109 MMHG | DIASTOLIC BLOOD PRESSURE: 56 MMHG

## 2017-04-24 VITALS — SYSTOLIC BLOOD PRESSURE: 108 MMHG | DIASTOLIC BLOOD PRESSURE: 69 MMHG

## 2017-04-24 VITALS — DIASTOLIC BLOOD PRESSURE: 56 MMHG | SYSTOLIC BLOOD PRESSURE: 137 MMHG

## 2017-04-24 VITALS — DIASTOLIC BLOOD PRESSURE: 55 MMHG | SYSTOLIC BLOOD PRESSURE: 128 MMHG

## 2017-04-24 VITALS — SYSTOLIC BLOOD PRESSURE: 128 MMHG | DIASTOLIC BLOOD PRESSURE: 58 MMHG

## 2017-04-24 VITALS — SYSTOLIC BLOOD PRESSURE: 142 MMHG | DIASTOLIC BLOOD PRESSURE: 65 MMHG

## 2017-04-24 VITALS — DIASTOLIC BLOOD PRESSURE: 63 MMHG | SYSTOLIC BLOOD PRESSURE: 145 MMHG

## 2017-04-24 VITALS — DIASTOLIC BLOOD PRESSURE: 60 MMHG | SYSTOLIC BLOOD PRESSURE: 123 MMHG

## 2017-04-24 VITALS — SYSTOLIC BLOOD PRESSURE: 130 MMHG | DIASTOLIC BLOOD PRESSURE: 58 MMHG

## 2017-04-24 VITALS — SYSTOLIC BLOOD PRESSURE: 102 MMHG | DIASTOLIC BLOOD PRESSURE: 54 MMHG

## 2017-04-24 VITALS — SYSTOLIC BLOOD PRESSURE: 130 MMHG | DIASTOLIC BLOOD PRESSURE: 62 MMHG

## 2017-04-24 VITALS — SYSTOLIC BLOOD PRESSURE: 132 MMHG | DIASTOLIC BLOOD PRESSURE: 60 MMHG

## 2017-04-24 VITALS — DIASTOLIC BLOOD PRESSURE: 54 MMHG | SYSTOLIC BLOOD PRESSURE: 124 MMHG

## 2017-04-24 VITALS — SYSTOLIC BLOOD PRESSURE: 104 MMHG | DIASTOLIC BLOOD PRESSURE: 52 MMHG

## 2017-04-24 VITALS — SYSTOLIC BLOOD PRESSURE: 120 MMHG | DIASTOLIC BLOOD PRESSURE: 64 MMHG

## 2017-04-24 VITALS — DIASTOLIC BLOOD PRESSURE: 68 MMHG | SYSTOLIC BLOOD PRESSURE: 118 MMHG

## 2017-04-24 VITALS — SYSTOLIC BLOOD PRESSURE: 132 MMHG | DIASTOLIC BLOOD PRESSURE: 62 MMHG

## 2017-04-24 VITALS — DIASTOLIC BLOOD PRESSURE: 52 MMHG | SYSTOLIC BLOOD PRESSURE: 136 MMHG

## 2017-04-24 VITALS — DIASTOLIC BLOOD PRESSURE: 53 MMHG | SYSTOLIC BLOOD PRESSURE: 123 MMHG

## 2017-04-24 VITALS — DIASTOLIC BLOOD PRESSURE: 58 MMHG | SYSTOLIC BLOOD PRESSURE: 134 MMHG

## 2017-04-24 VITALS — DIASTOLIC BLOOD PRESSURE: 54 MMHG | SYSTOLIC BLOOD PRESSURE: 117 MMHG

## 2017-04-24 VITALS — DIASTOLIC BLOOD PRESSURE: 51 MMHG | SYSTOLIC BLOOD PRESSURE: 132 MMHG

## 2017-04-24 LAB
ALBUMIN SERPL-MCNC: 2.4 G/DL (ref 3.4–5)
ALP SERPL-CCNC: 39 U/L (ref 46–116)
ALT SERPL-CCNC: 25 U/L (ref 10–68)
ANION GAP SERPL CALC-SCNC: 10.7 MMOL/L (ref 8–16)
BILIRUB SERPL-MCNC: 0.4 MG/DL (ref 0.2–1.3)
BUN SERPL-MCNC: 39 MG/DL (ref 7–18)
CALCIUM SERPL-MCNC: 7.7 MG/DL (ref 8.5–10.1)
CHLORIDE SERPL-SCNC: 99 MMOL/L (ref 98–107)
CO2 SERPL-SCNC: 30.8 MMOL/L (ref 21–32)
CREAT SERPL-MCNC: 1.5 MG/DL (ref 0.6–1.3)
ERYTHROCYTE [DISTWIDTH] IN BLOOD BY AUTOMATED COUNT: 14 % (ref 11.5–14.5)
GLOBULIN SER-MCNC: 2.8 G/L
GLUCOSE SERPL-MCNC: 256 MG/DL (ref 74–106)
HCT VFR BLD CALC: 23 % (ref 42–54)
HGB BLD-MCNC: 7.4 G/DL (ref 13.5–17.5)
MCH RBC QN AUTO: 31.9 PG (ref 26–34)
MCHC RBC AUTO-ENTMCNC: 32.2 G/DL (ref 31–37)
MCV RBC: 99.1 FL (ref 80–100)
OSMOLALITY SERPL CALC.SUM OF ELEC: 289 MOSM/KG (ref 275–300)
PLATELET # BLD: 100 10X3/UL (ref 130–400)
PMV BLD AUTO: 9.3 FL (ref 7.4–10.4)
POTASSIUM SERPL-SCNC: 4.5 MMOL/L (ref 3.5–5.1)
PROT SERPL-MCNC: 5.2 G/DL (ref 6.4–8.2)
RBC # BLD AUTO: 2.32 10X6/UL (ref 4.2–6.1)
SODIUM SERPL-SCNC: 136 MMOL/L (ref 136–145)
WBC # BLD AUTO: 6.3 10X3/UL (ref 4.8–10.8)

## 2017-04-24 NOTE — NUR
REPORT RECIEVED FROM NIGHT SHIFT NURSE. ASSESSMENT COMPLETE PER FLOWSHEET.
WILL CONT TO ASSESS FOR CHANGES.

## 2017-04-24 NOTE — NUR
NO CHANGES NOTED AT THIS TIME. PT ROTATING HEAD FROM SIDE TO SIDE. WILL NOT
FOLLOW COMMANDS AND STOP MOVING HEAD. BLOOD NOTED TO DRAIN SPONGE. SEDATION
INCREASED PER FLOWSHEET. WILL CONT TO ASSESS.

## 2017-04-24 NOTE — NUR
PT REQUESTING TO GO BACK TO BED, STATES " I HAVE BEEN UP SINCE 0300 THIS
MORNING", PT ASSISTED X 1 BACK TO BED, GAIT STEADY, PT ASSISTED TO REPOSITION
UP IN BED, CALL LIGHT IN REACH, PT DENIES NEEDS.

## 2017-04-24 NOTE — NUR
REASSESSMENT COMPLETED. SEE FLOWSHEET FOR ALL FINDINGS.
 RESTING SOUNDLY WITH NO DISTRESS. SPO2 96%. LUNGS CTA. DIM IN BASES.
RT AT BEDSIDE FOR UPDRAFT. COUGH/ DB AND INCENTIVE. GOOD EFFORT. PULLS 1500
 AWAKE AND ALERT WITH VOCAL STIM. MILD CONFUSION SEEN.
 FOLLOWS COMMANDS. MAEW. SR WITH OCC PACS ON THE MONITOR.
 TEMP PM INTACT AT VVI 60, SENSING ONLY.
 PULSES PALP. TEDS/SCDS ON. GENERALIZED EDEMA NOTED.
 SYS B/P WITHIN PARAMETERS. AFEBRILE.
 ABD DISTENDED. BS HYO X4. PASSING FLATUS.
 BLADDER NON PALP. DENIE URGE TO VOID. URINAL IN REACH. DENIES DISCOMFORT. PO
 FLUIDS AT BEDSIDE. C/L IN REACH. CONT CURRENT POC.

## 2017-04-24 NOTE — NUR
RESTING WITH NO DISTRESS. REPOSITIONED FOR COMFORT AND SKIN INTEGRITY. VSS. SR
ON THE MONITOR. HOB UP. C/L IN REACH. CONT CURRENT POC.

## 2017-04-24 NOTE — NUR
PT'S 02 SAT DROPPING BETWEEN 87-89% WHILE SLEEPING, 1LITER O2 PLACE ON PT AT
THIS TIME, O2 SAT 98%, WILL MONITOR CLOSELY FOR CHANGES.

## 2017-04-24 NOTE — NUR
RIGHT LEG DRESSINGS CHANGED. MODERATE SERO SANG DRNG SEEN.
CHX WIPE DOWN DONE. GOWN AND LINENS CHANGED. UP TO W/C FOR XRAY. ASSISTED TO
CHAIR AT BEDSIDE UPON RETURN. NO NEEDS VOICED. DENIES DISCOMFORT. VSS. SR ON
THE MONITOR. TPM INTACT. SPO2 97% ON 2 LPM NC. C/L AND PO FLUIDS IN REACH.
CONT CURRENT POC.

## 2017-04-24 NOTE — NUR
REPORT REC'D AND CARE ASSUMED, REC'D PT ON ROOM AIR SITTING UP IN RECLINER
DOZING, AWAKENS EASILY, ORIENTED X 4, MIDSTERNAL DRSG CDI, LDLSCL DRSG CDI
BOTH PORTS SALINE LOCKED, SUBSTERNAL DRSG CDI, EXTERNAL P/M WIRES TAPED
SECURELY, EXTERNAL P/M VVI 60 VMA 10, CM-SR, P/M SENSING, ABD DISTENDED, BS
HYPOACTIVE, PT REPORTS PASSING FLATUS, RIGHT LEG DRSGS TO GRAFT SITES CDI,
BILAT TEDS AND SCDS INTACT, PPP, CALL LIGHT IN REACH, PT DENIES NEEDS.

## 2017-04-25 VITALS — SYSTOLIC BLOOD PRESSURE: 121 MMHG | DIASTOLIC BLOOD PRESSURE: 60 MMHG

## 2017-04-25 VITALS — SYSTOLIC BLOOD PRESSURE: 108 MMHG | DIASTOLIC BLOOD PRESSURE: 40 MMHG

## 2017-04-25 VITALS — DIASTOLIC BLOOD PRESSURE: 63 MMHG | SYSTOLIC BLOOD PRESSURE: 133 MMHG

## 2017-04-25 VITALS — DIASTOLIC BLOOD PRESSURE: 55 MMHG | SYSTOLIC BLOOD PRESSURE: 116 MMHG

## 2017-04-25 VITALS — SYSTOLIC BLOOD PRESSURE: 121 MMHG | DIASTOLIC BLOOD PRESSURE: 61 MMHG

## 2017-04-25 VITALS — SYSTOLIC BLOOD PRESSURE: 134 MMHG | DIASTOLIC BLOOD PRESSURE: 56 MMHG

## 2017-04-25 VITALS — DIASTOLIC BLOOD PRESSURE: 68 MMHG | SYSTOLIC BLOOD PRESSURE: 140 MMHG

## 2017-04-25 VITALS — SYSTOLIC BLOOD PRESSURE: 120 MMHG | DIASTOLIC BLOOD PRESSURE: 64 MMHG

## 2017-04-25 VITALS — SYSTOLIC BLOOD PRESSURE: 134 MMHG | DIASTOLIC BLOOD PRESSURE: 61 MMHG

## 2017-04-25 VITALS — SYSTOLIC BLOOD PRESSURE: 155 MMHG | DIASTOLIC BLOOD PRESSURE: 65 MMHG

## 2017-04-25 VITALS — DIASTOLIC BLOOD PRESSURE: 62 MMHG | SYSTOLIC BLOOD PRESSURE: 135 MMHG

## 2017-04-25 VITALS — SYSTOLIC BLOOD PRESSURE: 136 MMHG | DIASTOLIC BLOOD PRESSURE: 64 MMHG

## 2017-04-25 VITALS — DIASTOLIC BLOOD PRESSURE: 65 MMHG | SYSTOLIC BLOOD PRESSURE: 118 MMHG

## 2017-04-25 VITALS — SYSTOLIC BLOOD PRESSURE: 152 MMHG | DIASTOLIC BLOOD PRESSURE: 72 MMHG

## 2017-04-25 VITALS — DIASTOLIC BLOOD PRESSURE: 60 MMHG | SYSTOLIC BLOOD PRESSURE: 122 MMHG

## 2017-04-25 VITALS — DIASTOLIC BLOOD PRESSURE: 65 MMHG | SYSTOLIC BLOOD PRESSURE: 146 MMHG

## 2017-04-25 VITALS — DIASTOLIC BLOOD PRESSURE: 69 MMHG | SYSTOLIC BLOOD PRESSURE: 154 MMHG

## 2017-04-25 VITALS — SYSTOLIC BLOOD PRESSURE: 130 MMHG | DIASTOLIC BLOOD PRESSURE: 80 MMHG

## 2017-04-25 VITALS — SYSTOLIC BLOOD PRESSURE: 136 MMHG | DIASTOLIC BLOOD PRESSURE: 63 MMHG

## 2017-04-25 VITALS — DIASTOLIC BLOOD PRESSURE: 60 MMHG | SYSTOLIC BLOOD PRESSURE: 132 MMHG

## 2017-04-25 VITALS — DIASTOLIC BLOOD PRESSURE: 63 MMHG | SYSTOLIC BLOOD PRESSURE: 121 MMHG

## 2017-04-25 VITALS — DIASTOLIC BLOOD PRESSURE: 57 MMHG | SYSTOLIC BLOOD PRESSURE: 148 MMHG

## 2017-04-25 VITALS — DIASTOLIC BLOOD PRESSURE: 67 MMHG | SYSTOLIC BLOOD PRESSURE: 140 MMHG

## 2017-04-25 VITALS — DIASTOLIC BLOOD PRESSURE: 60 MMHG | SYSTOLIC BLOOD PRESSURE: 140 MMHG

## 2017-04-25 LAB
ALBUMIN SERPL-MCNC: 2.2 G/DL (ref 3.4–5)
ALP SERPL-CCNC: 47 U/L (ref 46–116)
ALT SERPL-CCNC: 24 U/L (ref 10–68)
ANION GAP SERPL CALC-SCNC: 10.8 MMOL/L (ref 8–16)
BILIRUB SERPL-MCNC: 0.5 MG/DL (ref 0.2–1.3)
BUN SERPL-MCNC: 33 MG/DL (ref 7–18)
CALCIUM SERPL-MCNC: 7.7 MG/DL (ref 8.5–10.1)
CHLORIDE SERPL-SCNC: 98 MMOL/L (ref 98–107)
CO2 SERPL-SCNC: 31.4 MMOL/L (ref 21–32)
CREAT SERPL-MCNC: 1.3 MG/DL (ref 0.6–1.3)
ERYTHROCYTE [DISTWIDTH] IN BLOOD BY AUTOMATED COUNT: 13.8 % (ref 11.5–14.5)
GLOBULIN SER-MCNC: 3 G/L
GLUCOSE SERPL-MCNC: 241 MG/DL (ref 74–106)
HCT VFR BLD CALC: 21.1 % (ref 42–54)
HCT VFR BLD CALC: 22.9 % (ref 42–54)
HGB BLD-MCNC: 6.7 G/DL (ref 13.5–17.5)
HGB BLD-MCNC: 7.2 G/DL (ref 13.5–17.5)
MCH RBC QN AUTO: 31.2 PG (ref 26–34)
MCHC RBC AUTO-ENTMCNC: 31.8 G/DL (ref 31–37)
MCV RBC: 98.1 FL (ref 80–100)
OSMOLALITY SERPL CALC.SUM OF ELEC: 286 MOSM/KG (ref 275–300)
PLATELET # BLD: 124 10X3/UL (ref 130–400)
PMV BLD AUTO: 8.9 FL (ref 7.4–10.4)
POTASSIUM SERPL-SCNC: 4.2 MMOL/L (ref 3.5–5.1)
PROT SERPL-MCNC: 5.2 G/DL (ref 6.4–8.2)
RBC # BLD AUTO: 2.15 10X6/UL (ref 4.2–6.1)
SODIUM SERPL-SCNC: 136 MMOL/L (ref 136–145)
WBC # BLD AUTO: 5.9 10X3/UL (ref 4.8–10.8)

## 2017-04-25 NOTE — NUR
SUBSTERNAL DRSG AND RIGHT LEG DRSGS CHANGED AS ORDERED, STAPLES WELL
APPROXIMATED, CLEANED WITH BETADINE AND BETADINE OINTMENT APPLIED, COVERED
WITH 4X4'S AND MEDIPORE TAPE, PT TOLERATED WELL, REPOSITIONED UP IN BED FOR
COMFORT.

## 2017-04-25 NOTE — NUR
REPORTED TO TANI KIDD THAT PT WAS EXPERIENCING ABD DISCOMOFORT. REGLAN
ORDERED. WILL ADMINISTER DOSE.

## 2017-04-25 NOTE — NUR
EVENING MEDS GIVEN, FSBS 241, 12 UNITS HUMALOG GIVEN, PT DENIES FURTHER NEEDS,
SR UP X 2, BED IN LOW POSITION, CALL LIGHT IN REACH.

## 2017-04-25 NOTE — NUR
Is the patient Alert and Oriented? Yes  0
* How many steps to enter\exit or inside your home? 1  0
* PCP DR. BATRES  0
* Pharmacy [facility] [address]  0
* Preadmission Environment Home with Family  0
* ADLs Independent  0
* Equipment None  0
* List name and contact numbers for known caregivers / representatives who
currently or will assist patient after discharge: SPOUSE: JLUIS [phone]  0
 
* Community resources currently utilized None  0
* Additional services required to return to the preadmission environment? No
0
* Can the patient safely return to the preadmission environment? Yes  0
* Has this patient been hospitalized within the prior 30 days at any hospital?
No
PATIENT STATES HE WAS INDEPENDENT PRIOR TO COMING TO THE HOSPITAL. PATIENT
STATES HIS WIFE, JLUIS, WILL DRIVE HIM HOME AT DISCHARGE. PATIENT'S PCP IS DR BATRES. HE GETS HIS MEDICATIONS AT [facility] [address]. PATIENT DENIES USE
OF ANY EQUIPMENT. HE DENIES EVER HAVING HOME HEALTH CARE. THERE IS ONLY 1 STEP
TO ENTER HIS HOME. NO DISCHARGE NEEDS IDENTIFIED AT THIS TIME.

## 2017-04-25 NOTE — NUR
REPORT REC'D AND CARE ASSUMED, REC'D PT RESTING IN RECLINER EYES CLOSED, VSS,
MIDSTERNAL DRSG CDI, LDLSCL DRSG CDI SALINE LOCKED, ABD DISTENDED SEMISOFT,
BS HYPOACTIVE X 4, SUBSTERNAL DRSG CDI, EXTERNAL P/M VVI 30 VMA 10, P/M V
SENSING, CM- SR @ 80, RIGHT LEG DRSGS CDI, BILAT TEDS/SCDS INTACT, CALL LIGHT
IN REACH.

## 2017-04-25 NOTE — NUR
INCREASE IN APPETITE NOTED. PT ATE 75% OF MEAL. STATES HE IS HAVING A LOT OF
FLATULANCE, BUT HAS NOT HAD A BM YET. WILL RELAY TO .

## 2017-04-25 NOTE — NUR
PT ASSISTED BACK TO BED, SIP OF WATER GIVEN FOR COMPALINTS OF DRY MOUTH, WILL
MONITOR CLOSELY FOR CHANGES.

## 2017-04-25 NOTE — NUR
BATH AND LINEN CHANGE PROVIDED AFTER RETURNING FROM RADIOLOGY, PT COMPLAINS OF
NAUSEA UPON RETURNING TO ROOM, WISHES TO RETURN TO BED UNTIL BREAKFAST, NAUSEA
MED OFFERED, PT REFUSED AT THIS TIME, STATES " I WANT TO WAIT A LITTLE WHILE".

## 2017-04-25 NOTE — NUR
PT REMAINS UP IN CHAIR, RT AT BS FOR BREATHING TX, PT REQUESTING SOMETHING FOR
PAIN, NORCO GIVEN PO AS REQUESTED AND ZOFRAN 4MG GIVEN SLOW IVP FOR COMPLAINTS
OF SLIGHT NAUSEA, PT ASSISTED TO BATHROOM ,INSTUCTED TO CALL FOR ASSISTANCE
BEFORE RETURING TO CHAIR.

## 2017-04-25 NOTE — NUR
PT RESTING QUIETLY DOZING AT TIMES, REPORTS FEELING SOMEWHAT BETTER, WILL CONT
TO MONITOR FOR CHANGES.

## 2017-04-25 NOTE — NUR
DR. ALEJANDRO AT BEDSIDE. UPDATE PROVIDED. TPM CHANGED TO VVI 30. PT REMAINS IN
SR RATE IN 70'S. WILL CONT TO ASSESS.

## 2017-04-25 NOTE — NUR
PT AWAKE, STATES " I AM LAYING HERE GOING BALD" " I AM ANXIOUS", NORCO
PROVIDED TO ASSIST PT TO RELAX  AND REST, PT ASSISTED TO USE TV REMOTE TO FIND
SOMETHING TO WATCH ON TV.

## 2017-04-25 NOTE — NUR
REPORT RECIEVED FROM NIGHT SHIFT NURSE. PT RESTING IN CHAIR QUIELTY.
ASSESSMENT COMPLETE PER FLOWSHEET. CALL LIGHT IN REACH. BED IN LOW POSITION.
VSS AT THIS TIME. WILL CONT TO ASSESS.

## 2017-04-26 VITALS — DIASTOLIC BLOOD PRESSURE: 61 MMHG | SYSTOLIC BLOOD PRESSURE: 129 MMHG

## 2017-04-26 VITALS — DIASTOLIC BLOOD PRESSURE: 61 MMHG | SYSTOLIC BLOOD PRESSURE: 121 MMHG

## 2017-04-26 VITALS — SYSTOLIC BLOOD PRESSURE: 140 MMHG | DIASTOLIC BLOOD PRESSURE: 59 MMHG

## 2017-04-26 VITALS — SYSTOLIC BLOOD PRESSURE: 143 MMHG | DIASTOLIC BLOOD PRESSURE: 68 MMHG

## 2017-04-26 VITALS — SYSTOLIC BLOOD PRESSURE: 137 MMHG | DIASTOLIC BLOOD PRESSURE: 63 MMHG

## 2017-04-26 VITALS — DIASTOLIC BLOOD PRESSURE: 65 MMHG | SYSTOLIC BLOOD PRESSURE: 145 MMHG

## 2017-04-26 VITALS — DIASTOLIC BLOOD PRESSURE: 68 MMHG | SYSTOLIC BLOOD PRESSURE: 134 MMHG

## 2017-04-26 VITALS — DIASTOLIC BLOOD PRESSURE: 58 MMHG | SYSTOLIC BLOOD PRESSURE: 141 MMHG

## 2017-04-26 VITALS — SYSTOLIC BLOOD PRESSURE: 132 MMHG | DIASTOLIC BLOOD PRESSURE: 68 MMHG

## 2017-04-26 VITALS — DIASTOLIC BLOOD PRESSURE: 58 MMHG | SYSTOLIC BLOOD PRESSURE: 120 MMHG

## 2017-04-26 VITALS — DIASTOLIC BLOOD PRESSURE: 66 MMHG | SYSTOLIC BLOOD PRESSURE: 120 MMHG

## 2017-04-26 VITALS — DIASTOLIC BLOOD PRESSURE: 71 MMHG | SYSTOLIC BLOOD PRESSURE: 142 MMHG

## 2017-04-26 VITALS — DIASTOLIC BLOOD PRESSURE: 73 MMHG | SYSTOLIC BLOOD PRESSURE: 127 MMHG

## 2017-04-26 VITALS — SYSTOLIC BLOOD PRESSURE: 136 MMHG | DIASTOLIC BLOOD PRESSURE: 55 MMHG

## 2017-04-26 VITALS — SYSTOLIC BLOOD PRESSURE: 138 MMHG | DIASTOLIC BLOOD PRESSURE: 70 MMHG

## 2017-04-26 VITALS — SYSTOLIC BLOOD PRESSURE: 120 MMHG | DIASTOLIC BLOOD PRESSURE: 67 MMHG

## 2017-04-26 VITALS — SYSTOLIC BLOOD PRESSURE: 130 MMHG | DIASTOLIC BLOOD PRESSURE: 64 MMHG

## 2017-04-26 VITALS — DIASTOLIC BLOOD PRESSURE: 61 MMHG | SYSTOLIC BLOOD PRESSURE: 146 MMHG

## 2017-04-26 VITALS — DIASTOLIC BLOOD PRESSURE: 50 MMHG | SYSTOLIC BLOOD PRESSURE: 120 MMHG

## 2017-04-26 VITALS — DIASTOLIC BLOOD PRESSURE: 66 MMHG | SYSTOLIC BLOOD PRESSURE: 114 MMHG

## 2017-04-26 VITALS — SYSTOLIC BLOOD PRESSURE: 123 MMHG | DIASTOLIC BLOOD PRESSURE: 58 MMHG

## 2017-04-26 VITALS — DIASTOLIC BLOOD PRESSURE: 70 MMHG | SYSTOLIC BLOOD PRESSURE: 138 MMHG

## 2017-04-26 LAB
ALBUMIN SERPL-MCNC: 2.1 G/DL (ref 3.4–5)
ALP SERPL-CCNC: 63 U/L (ref 46–116)
ALT SERPL-CCNC: 24 U/L (ref 10–68)
ANION GAP SERPL CALC-SCNC: 12.6 MMOL/L (ref 8–16)
BILIRUB SERPL-MCNC: 0.7 MG/DL (ref 0.2–1.3)
BUN SERPL-MCNC: 35 MG/DL (ref 7–18)
CALCIUM SERPL-MCNC: 7.4 MG/DL (ref 8.5–10.1)
CHLORIDE SERPL-SCNC: 99 MMOL/L (ref 98–107)
CO2 SERPL-SCNC: 29 MMOL/L (ref 21–32)
CREAT SERPL-MCNC: 1.3 MG/DL (ref 0.6–1.3)
ERYTHROCYTE [DISTWIDTH] IN BLOOD BY AUTOMATED COUNT: 13.6 % (ref 11.5–14.5)
GLOBULIN SER-MCNC: 3.1 G/L
GLUCOSE SERPL-MCNC: 279 MG/DL (ref 74–106)
HCT VFR BLD CALC: 24.5 % (ref 42–54)
HGB BLD-MCNC: 8.1 G/DL (ref 13.5–17.5)
MCH RBC QN AUTO: 32.3 PG (ref 26–34)
MCHC RBC AUTO-ENTMCNC: 33.1 G/DL (ref 31–37)
MCV RBC: 97.6 FL (ref 80–100)
OSMOLALITY SERPL CALC.SUM OF ELEC: 289 MOSM/KG (ref 275–300)
PLATELET # BLD: 135 10X3/UL (ref 130–400)
PMV BLD AUTO: 8.9 FL (ref 7.4–10.4)
POTASSIUM SERPL-SCNC: 4.6 MMOL/L (ref 3.5–5.1)
PROT SERPL-MCNC: 5.2 G/DL (ref 6.4–8.2)
RBC # BLD AUTO: 2.51 10X6/UL (ref 4.2–6.1)
SODIUM SERPL-SCNC: 136 MMOL/L (ref 136–145)
WBC # BLD AUTO: 9.9 10X3/UL (ref 4.8–10.8)

## 2017-04-26 NOTE — NUR
REPORT RECEIVED. ASSUMED CARE OF PT. SHIFT ASSESSMENT COMPLETE. SEE FLOWSHEET
FOR FINDINGS. PT UP IN CHAIR, DENIES NEEDS AT THIS TIME. PT APPEARS TO BE
TRYING TO CONVERT OUT OF SINUS.

## 2017-04-26 NOTE — NUR
PT AMBULATED TO BATHROOM AND CONTINENT OF SMALL AMOUNT OF LIQUID STOOL. GAIT
STEADY WITH AMBULATION. PT DENIES PAIN. ASSISTED TO BED AND PT REQUESTED BUTT
PASTE BE APPLIED TO COCCYX. PT STATED THIS AREA IS SORE. NO VISIBLE
IRREGULARITY OBSERVED BUT CREAM APPLIED AS REQUESTED. CALL LIGHT LEFT IN
REACH.

## 2017-04-26 NOTE — CN
PATIENT NAME:MARINA ASHFORD                               MEDICAL RECORD: [id number]
: 46                                              LOCATION:[id number]
ADMIT DATE: 17                                       ACCOUNT: [id number]
CONSULTING PHYSICIAN:    MARY LOMBARDO MD             
                                               
REFERRING PHYSICIAN:     LEWIS ALEJANDRO MD             
 
 
DATE OF CONSULTATION:  2017
 
Surgical Consultation
 
SURGEON:  Mary Lombardo MD.
 
REASON FOR CONSULTATION:  Abdominal pain.
 
HISTORY OF PRESENT ILLNESS:  A 66-year-old male who is postoperative day #6 from
a coronary artery bypass graft.  The patient was made n.p.o. yesterday for
increased abdominal distention and abdominal pain.  The patient had previously
been tolerating a diet and having bowel function.  He has some nausea.  No
episodes of vomiting.   Currently, he is passing flatus.  He did have a bowel
movement yesterday after a suppository.  He describes crampy abdominal bloating
pain at this time, it is intermittent, fluctuates in intensity.  It does not
radiate.  The abdominal x-ray this morning showed early partial small bowel
obstruction.  There is some air seen within the colon.
 
PAST MEDICAL HISTORY:  Coronary artery disease, diabetes, hyperlipidemia,
dermatitis, gout, osteoarthritis.
 
PAST SURGICAL HISTORY:  Right shoulder surgery, right knee surgery, left wrist
surgery, CABG.
 
MEDICATIONS:  Please see electronic medical record.
 
SOCIAL HISTORY:  He denies alcohol or tobacco use.
 
FAMILY HISTORY:  No family history of colon cancer.
 
REVIEW OF SYSTEMS:  A 12-point review of systems was obtained, pertinent
positive and negative as per the HPI.
 
PHYSICAL EXAMINATION:
VITAL SIGNS:  Heart rate 86, respiratory rate 16, blood pressure 132/68, satting
94% on room air.
GENERAL:  Well-developed, well-nourished male in mild distress.
EYES:  Extraocular muscles intact.
PSYCHIATRIC:  He is alert and oriented times 3.
EAR, NOSE, AND THROAT:  Normal dentition.
CARDIOVASCULAR:  Normal sinus rhythm.  He has a dressing over his midline
sternotomy.
LUNGS:  He has decreased breath sounds bilaterally, otherwise normal.
ABDOMEN:  Soft, moderately distended.  He is tympanitic in the right upper
quadrant.  No guarding.  No rebound.  No peritoneal signs.  No masses.  No
hernias identified.
SKIN:  Warm and dry with normal turgor.
EXTREMITIES:  Warm.  He has got mild peripheral edema.
NEUROLOGIC:  GCS of 15 with no focal deficits.
 
 
 
 
CONSULT REPORT                                 [id number]    MARINA ASHFORD             
 
 
LABORATORY DATA:  White count 9.9, hemoglobin 8.1, hematocrit 24.5, platelet
count 135.  Sodium 136, potassium 4.6, chloride 99, BUN 35, creatinine 1.3.
 
IMAGING:  Abdominal x-ray images personally reviewed shows distended small bowel
loops with a small amount of colonic gas in the right colon.  There is no free
air in the abdomen.
 
IMPRESSION:  A 66-year-old male with postoperative ileus.
 
RECOMMENDATIONS:
1.  Sham feedings to include ice chips, hard candy and chewing gum.  The patient
may have clears sparingly.  We will give patient an oral bowel regimen.
2.  Continue rectal suppositories as needed.  Repeat serial abdominal x-ray in
a.m.
3.  Serial abdominal exams.
4.  Minimize narcotics.
5.  Increase ambulation.
 
TRANSINT:[id number] Voice Confirmation ID: [id number] DOCUMENT ID: [id number]
                                           
                                           MARY LOMBARDO MD             
 
 
 
Electronically Signed by MARY LOMBARDO on 17 at 2157
 
 
 
 
 
 
 
 
 
 
 
 
 
 
 
 
 
 
 
 
 
 
CC:                                                             [id number]
DICTATION DATE: 17 1233     :     17 1822      ADM IN  
                                                                              
[facility]                                          
[address] [place] [address]

## 2017-04-26 NOTE — NUR
PT ASSISTED FROM TOILET TO CHAIR AT BEDSIDE. HAS HAD BOWEL MOVEMENT. SAYS "NO"
WHEN ASKED IF HE PASSED ANY GAS ALONG WITH THE STOOL.

## 2017-04-26 NOTE — NUR
Nutrition Follow Up:
Chart reviewed. Pt was asleep at the time of RD visit. Noted KUB on 4/25/17.
Noted per chart pt with c/o feeling full. Appetite/po intake fair prior to
diet change.
Pt remains NPO. Wt loss since admit noted. I<O. +BM 4/26/17. Labs reviewed -
Glucose, BUN elevated. Meds noted including Reglan, Humalog, Albumin.
Rec advancing diet as tolerated when medically feasible.
RD will continue to monitor pt progress.

## 2017-04-26 NOTE — NUR
PT RETURNED TO ROOM FROM XRAY, COMPLETE LINEN CHANGE PROVIDED, PT REQUESTING
TO GO BACK TO BED, ASSISTED BACK TO BED, LDLSCL DRSG, MIDSTERNAL, AND
SUBSTERNAL DRSGS CHANGED PER PROTOCOL, PT SLEEPING AFTER DRSG CHANGES, WILL
ALLOW TO REST AT THIS TIME, VISIBLE TO NURSES STATION.

## 2017-04-26 NOTE — NUR
PT UP IN CHAIR AT BEDSIDE, AS HAS BEEN SINCE START OF SHIFT. STILL C/O BELLY
FULLNESS. SAYS HAS NOT PASSED ANY GAS IN A WHILE.

## 2017-04-26 NOTE — NUR
VISITOR AT BEDSIDE. PT UPDATING VISITOR. HS MEDS GIVEN AFTER TEACHING DONE. PO
MEDS GIVEN WITH SIPS OF WATER. PT VOIDING EASILY. NO OTHER REQUESTS.

## 2017-04-26 NOTE — TEE
PATIENT:MARINA ASHFORD                 MEDICAL RECORD: [id number]
                                               LOCATION:[place] [address]
AGE OF PATIENT: 66                             ADMISSION DATE: 04/21/17
SEX: M   
 
REFERRING PHYSICIAN:                                                             
 
INTERPRETING PHYSICIAN: ISABELLE DOWNING MD             

 
 
                         TRANSESOPHAGEAL ECHOCARDIOGRAM
                 WYATT CHARGE  Y
                INDICATIONS: CABG                     
 
             PREMEDICATIONS:                               
 
PATIENT'S RESPONSE PROCEDURE                          
 
                     DOPPLER MEASUREMENTS:
            LVIT            LA           PA           RA     
            LVOT          RVOT      Asc. Ao     
AV Gradient Peak       AV Mean      AV Area     
MV Gradient Peak       MV Mean      MV Area     
 INTERPRETATION: LVd: 3.6 cm              
                 LVs: 2.1 cm              
 
 
 
               Doppler:                          
 
                   2-D:                          
 
     COLOR FLOW DOPPLER                          
 
   NORMAL SALINE STUDY:                     
 
          MISCELLANOUS:                          
 
             DIAGNOSIS:                          
 
                  PLAN:                          
 
           Cardiologist:2          Dr. Schumacher                
   Cardiac Sonographer: Zaki CARRENO            
              COMMENTS:                                              
                                                                                     
 
 
DATE OF SERVICE:  04/21/2017
 
Transesophageal echo evaluation of valvular structures during bypass surgery.
 
FINDINGS:
1.  Left ventricle chamber size is within normal limits.  Left ventricular
systolic function is normal.  Overall ejection fraction estimated at 55%.
2.  Left atrium, right atrium, and right ventricular chamber sizes are mildly
dilated.
 
 
 
TRANSESOPHAGEAL ECHOCARDIOGRAM REPORT                    [id number]    MARINA ASHFORD
 
 
3.  Valvular structures have normal structure and motion.
4.  Doppler interrogation only reveals mild mitral regurgitation.  No other
valvular insufficiency or stenosis.
5.  No evidence of pericardial effusion or left ventricular thrombus.
 
TRANSINT:[id number] Voice Confirmation ID: [id number] DOCUMENT ID: [id number]
 
 
 
Electronically Signed by ISABELLE DOWNING on 04/26/17 at 0806
 
 
 
 
 
 
 
 
 
 
 
 
 
 
 
 
 
 
 
 
 
 
 
 
 
 
 
 
 
 
 
 
 
 
 
 
CC:                                                             [id number]
DICTATION DATE: 04/21/17 1441     :     04/22/17 0754      ADM IN  
                                                                              
[place] [id number] [place] [address]

## 2017-04-27 VITALS — DIASTOLIC BLOOD PRESSURE: 68 MMHG | SYSTOLIC BLOOD PRESSURE: 127 MMHG

## 2017-04-27 VITALS — SYSTOLIC BLOOD PRESSURE: 135 MMHG | DIASTOLIC BLOOD PRESSURE: 70 MMHG

## 2017-04-27 VITALS — SYSTOLIC BLOOD PRESSURE: 117 MMHG | DIASTOLIC BLOOD PRESSURE: 59 MMHG

## 2017-04-27 VITALS — SYSTOLIC BLOOD PRESSURE: 142 MMHG | DIASTOLIC BLOOD PRESSURE: 65 MMHG

## 2017-04-27 VITALS — SYSTOLIC BLOOD PRESSURE: 120 MMHG | DIASTOLIC BLOOD PRESSURE: 46 MMHG

## 2017-04-27 VITALS — SYSTOLIC BLOOD PRESSURE: 130 MMHG | DIASTOLIC BLOOD PRESSURE: 56 MMHG

## 2017-04-27 VITALS — SYSTOLIC BLOOD PRESSURE: 117 MMHG | DIASTOLIC BLOOD PRESSURE: 54 MMHG

## 2017-04-27 VITALS — DIASTOLIC BLOOD PRESSURE: 62 MMHG | SYSTOLIC BLOOD PRESSURE: 146 MMHG

## 2017-04-27 VITALS — DIASTOLIC BLOOD PRESSURE: 64 MMHG | SYSTOLIC BLOOD PRESSURE: 136 MMHG

## 2017-04-27 VITALS — DIASTOLIC BLOOD PRESSURE: 63 MMHG | SYSTOLIC BLOOD PRESSURE: 145 MMHG

## 2017-04-27 VITALS — DIASTOLIC BLOOD PRESSURE: 61 MMHG | SYSTOLIC BLOOD PRESSURE: 133 MMHG

## 2017-04-27 VITALS — DIASTOLIC BLOOD PRESSURE: 67 MMHG | SYSTOLIC BLOOD PRESSURE: 132 MMHG

## 2017-04-27 VITALS — SYSTOLIC BLOOD PRESSURE: 116 MMHG | DIASTOLIC BLOOD PRESSURE: 42 MMHG

## 2017-04-27 VITALS — SYSTOLIC BLOOD PRESSURE: 109 MMHG | DIASTOLIC BLOOD PRESSURE: 63 MMHG

## 2017-04-27 VITALS — SYSTOLIC BLOOD PRESSURE: 116 MMHG | DIASTOLIC BLOOD PRESSURE: 58 MMHG

## 2017-04-27 VITALS — DIASTOLIC BLOOD PRESSURE: 63 MMHG | SYSTOLIC BLOOD PRESSURE: 119 MMHG

## 2017-04-27 VITALS — SYSTOLIC BLOOD PRESSURE: 128 MMHG | DIASTOLIC BLOOD PRESSURE: 61 MMHG

## 2017-04-27 VITALS — SYSTOLIC BLOOD PRESSURE: 142 MMHG | DIASTOLIC BLOOD PRESSURE: 62 MMHG

## 2017-04-27 VITALS — DIASTOLIC BLOOD PRESSURE: 64 MMHG | SYSTOLIC BLOOD PRESSURE: 144 MMHG

## 2017-04-27 VITALS — DIASTOLIC BLOOD PRESSURE: 59 MMHG | SYSTOLIC BLOOD PRESSURE: 122 MMHG

## 2017-04-27 VITALS — SYSTOLIC BLOOD PRESSURE: 120 MMHG | DIASTOLIC BLOOD PRESSURE: 59 MMHG

## 2017-04-27 VITALS — SYSTOLIC BLOOD PRESSURE: 124 MMHG | DIASTOLIC BLOOD PRESSURE: 46 MMHG

## 2017-04-27 VITALS — DIASTOLIC BLOOD PRESSURE: 65 MMHG | SYSTOLIC BLOOD PRESSURE: 135 MMHG

## 2017-04-27 LAB
ALBUMIN SERPL-MCNC: 2.1 G/DL (ref 3.4–5)
ALP SERPL-CCNC: 56 U/L (ref 46–116)
ALT SERPL-CCNC: 20 U/L (ref 10–68)
ANION GAP SERPL CALC-SCNC: 8.9 MMOL/L (ref 8–16)
BILIRUB SERPL-MCNC: 0.6 MG/DL (ref 0.2–1.3)
BUN SERPL-MCNC: 30 MG/DL (ref 7–18)
CALCIUM SERPL-MCNC: 7 MG/DL (ref 8.5–10.1)
CHLORIDE SERPL-SCNC: 101 MMOL/L (ref 98–107)
CO2 SERPL-SCNC: 33.2 MMOL/L (ref 21–32)
CREAT SERPL-MCNC: 1.2 MG/DL (ref 0.6–1.3)
ERYTHROCYTE [DISTWIDTH] IN BLOOD BY AUTOMATED COUNT: 14.1 % (ref 11.5–14.5)
GLOBULIN SER-MCNC: 2.3 G/L
GLUCOSE SERPL-MCNC: 206 MG/DL (ref 74–106)
HCT VFR BLD CALC: 23.2 % (ref 42–54)
HGB BLD-MCNC: 7.4 G/DL (ref 13.5–17.5)
MCH RBC QN AUTO: 31.6 PG (ref 26–34)
MCHC RBC AUTO-ENTMCNC: 31.9 G/DL (ref 31–37)
MCV RBC: 99.1 FL (ref 80–100)
OSMOLALITY SERPL CALC.SUM OF ELEC: 289 MOSM/KG (ref 275–300)
PLATELET # BLD: 158 10X3/UL (ref 130–400)
PMV BLD AUTO: 9.3 FL (ref 7.4–10.4)
POTASSIUM SERPL-SCNC: 4.1 MMOL/L (ref 3.5–5.1)
PROT SERPL-MCNC: 4.4 G/DL (ref 6.4–8.2)
RBC # BLD AUTO: 2.34 10X6/UL (ref 4.2–6.1)
SODIUM SERPL-SCNC: 139 MMOL/L (ref 136–145)
WBC # BLD AUTO: 7.5 10X3/UL (ref 4.8–10.8)

## 2017-04-27 NOTE — NUR
REASSESSMENT COMPLETED. SEE FLOWSHEET FOR FULL DETAILS. REPLACED SUCTION
CAN AT THIS TIME. NO OTHER CHANGES. WILL MONITOR

## 2017-04-27 NOTE — NUR
PT CONTINUES TO CO NAUSEA DR LOMBARDO NOTIFIED.  NGT ORDERED.  18 [place]ian NGT
DROPPED.  300CC NOTED.

## 2017-04-27 NOTE — NUR
PT UP TO BATHROOM. CONTINENT OF LARGE LOOSE BLACK STOOL. UNABLE TO COLLECT A
SPECIMEN. BSC TO ROOM IN ORDER TO SECURE SPECIMEN IS PHYSICIAN ORDERS.
DRESSINGS TO RIGHT LEG CLEANED AND SWABBED WITH BETADINE AND DRESSINGS
APPLIED. PT TOLERATED WELL

## 2017-04-28 VITALS — DIASTOLIC BLOOD PRESSURE: 57 MMHG | SYSTOLIC BLOOD PRESSURE: 138 MMHG

## 2017-04-28 VITALS — DIASTOLIC BLOOD PRESSURE: 58 MMHG | SYSTOLIC BLOOD PRESSURE: 127 MMHG

## 2017-04-28 VITALS — SYSTOLIC BLOOD PRESSURE: 126 MMHG | DIASTOLIC BLOOD PRESSURE: 57 MMHG

## 2017-04-28 VITALS — DIASTOLIC BLOOD PRESSURE: 54 MMHG | SYSTOLIC BLOOD PRESSURE: 142 MMHG

## 2017-04-28 VITALS — SYSTOLIC BLOOD PRESSURE: 128 MMHG | DIASTOLIC BLOOD PRESSURE: 58 MMHG

## 2017-04-28 VITALS — DIASTOLIC BLOOD PRESSURE: 46 MMHG | SYSTOLIC BLOOD PRESSURE: 140 MMHG

## 2017-04-28 VITALS — SYSTOLIC BLOOD PRESSURE: 142 MMHG | DIASTOLIC BLOOD PRESSURE: 59 MMHG

## 2017-04-28 VITALS — SYSTOLIC BLOOD PRESSURE: 132 MMHG | DIASTOLIC BLOOD PRESSURE: 62 MMHG

## 2017-04-28 VITALS — DIASTOLIC BLOOD PRESSURE: 65 MMHG | SYSTOLIC BLOOD PRESSURE: 129 MMHG

## 2017-04-28 VITALS — SYSTOLIC BLOOD PRESSURE: 133 MMHG | DIASTOLIC BLOOD PRESSURE: 61 MMHG

## 2017-04-28 VITALS — SYSTOLIC BLOOD PRESSURE: 139 MMHG | DIASTOLIC BLOOD PRESSURE: 53 MMHG

## 2017-04-28 VITALS — DIASTOLIC BLOOD PRESSURE: 52 MMHG | SYSTOLIC BLOOD PRESSURE: 138 MMHG

## 2017-04-28 VITALS — DIASTOLIC BLOOD PRESSURE: 51 MMHG | SYSTOLIC BLOOD PRESSURE: 132 MMHG

## 2017-04-28 VITALS — DIASTOLIC BLOOD PRESSURE: 57 MMHG | SYSTOLIC BLOOD PRESSURE: 132 MMHG

## 2017-04-28 VITALS — SYSTOLIC BLOOD PRESSURE: 123 MMHG | DIASTOLIC BLOOD PRESSURE: 58 MMHG

## 2017-04-28 VITALS — SYSTOLIC BLOOD PRESSURE: 120 MMHG | DIASTOLIC BLOOD PRESSURE: 61 MMHG

## 2017-04-28 VITALS — DIASTOLIC BLOOD PRESSURE: 59 MMHG | SYSTOLIC BLOOD PRESSURE: 137 MMHG

## 2017-04-28 VITALS — SYSTOLIC BLOOD PRESSURE: 127 MMHG | DIASTOLIC BLOOD PRESSURE: 57 MMHG

## 2017-04-28 VITALS — DIASTOLIC BLOOD PRESSURE: 67 MMHG | SYSTOLIC BLOOD PRESSURE: 135 MMHG

## 2017-04-28 VITALS — DIASTOLIC BLOOD PRESSURE: 63 MMHG | SYSTOLIC BLOOD PRESSURE: 139 MMHG

## 2017-04-28 VITALS — DIASTOLIC BLOOD PRESSURE: 59 MMHG | SYSTOLIC BLOOD PRESSURE: 127 MMHG

## 2017-04-28 VITALS — SYSTOLIC BLOOD PRESSURE: 139 MMHG | DIASTOLIC BLOOD PRESSURE: 59 MMHG

## 2017-04-28 VITALS — SYSTOLIC BLOOD PRESSURE: 115 MMHG | DIASTOLIC BLOOD PRESSURE: 61 MMHG

## 2017-04-28 VITALS — SYSTOLIC BLOOD PRESSURE: 116 MMHG | DIASTOLIC BLOOD PRESSURE: 54 MMHG

## 2017-04-28 VITALS — SYSTOLIC BLOOD PRESSURE: 130 MMHG | DIASTOLIC BLOOD PRESSURE: 59 MMHG

## 2017-04-28 LAB
ALBUMIN SERPL-MCNC: 1.9 G/DL (ref 3.4–5)
ALP SERPL-CCNC: 59 U/L (ref 46–116)
ALT SERPL-CCNC: 19 U/L (ref 10–68)
ANION GAP SERPL CALC-SCNC: 8.8 MMOL/L (ref 8–16)
BILIRUB SERPL-MCNC: 0.6 MG/DL (ref 0.2–1.3)
BUN SERPL-MCNC: 24 MG/DL (ref 7–18)
CALCIUM SERPL-MCNC: 6.9 MG/DL (ref 8.5–10.1)
CHLORIDE SERPL-SCNC: 101 MMOL/L (ref 98–107)
CO2 SERPL-SCNC: 31.1 MMOL/L (ref 21–32)
CREAT SERPL-MCNC: 1.1 MG/DL (ref 0.6–1.3)
ERYTHROCYTE [DISTWIDTH] IN BLOOD BY AUTOMATED COUNT: 14.4 % (ref 11.5–14.5)
GLOBULIN SER-MCNC: 2.9 G/L
GLUCOSE SERPL-MCNC: 197 MG/DL (ref 74–106)
HCT VFR BLD CALC: 23.2 % (ref 42–54)
HGB BLD-MCNC: 7.3 G/DL (ref 13.5–17.5)
MCH RBC QN AUTO: 31.3 PG (ref 26–34)
MCHC RBC AUTO-ENTMCNC: 31.5 G/DL (ref 31–37)
MCV RBC: 99.6 FL (ref 80–100)
OSMOLALITY SERPL CALC.SUM OF ELEC: 282 MOSM/KG (ref 275–300)
PLATELET # BLD: 158 10X3/UL (ref 130–400)
PMV BLD AUTO: 8.8 FL (ref 7.4–10.4)
POTASSIUM SERPL-SCNC: 3.9 MMOL/L (ref 3.5–5.1)
PROT SERPL-MCNC: 4.8 G/DL (ref 6.4–8.2)
RBC # BLD AUTO: 2.33 10X6/UL (ref 4.2–6.1)
SODIUM SERPL-SCNC: 137 MMOL/L (ref 136–145)
WBC # BLD AUTO: 7.6 10X3/UL (ref 4.8–10.8)

## 2017-04-28 NOTE — NUR
Nutrition Follow Up:
Pt reported that his appetite is good and he feels hungry. He said that he has
severe abdominal pain, nausea after eating. Pt is NPO at this time and has NGT
in place. I>O. Wt continues to decrease. Labs reviewed - Glucose elevated.
Meds noted including Reglan, Humalog, Albumin.
Rec resuming diet when medically feasible. RD will continue to monitor pt
progress.

## 2017-04-28 NOTE — NUR
ASSISTED UP TO USE BATHROOM TOILET. RT UPPER THIGH DRSG SATURATED AND CAME OFF
BY ITSELF. HAD SMALL BROWN TINY PELLETS OF BM NOTED IN TOILET. CLEAR YELLOW
URINE ALSO NOTED IN TOILET. BACK TO BED. RT LEG DRSG CHANGED PER PROTOCOL.
REDRESSED RT NARE FOR NG TUBE TO BE BETTER SECURED. REQUESTING MEDS FOR SLEEP.
DR. ALEJANDRO ORDERED ATIVAN IV AND GAVE PER HIS REQUEST-SEE EMAR. WILL MONITOR.

## 2017-04-28 NOTE — NUR
0715-RECIEVED AWAKE AND ALERT-NGT TO LOW INTER WALL SUCTION-L CVL TO CORDARONE
AT 0.5MG/MIN-ASSISTED OUT OF BED AND AMBULATED TO BATHROOM-LARGE AMOUNT OF
DIARRHEA-NO AUDIBLE BOWEL SOUNDS-ASSISTED TO BEDSIDE CHAIR
0730-PT TO XRAY VIA WHEELCHAIR FOR 2 VIEW ABD-BY RADIOLOGY TECH
0815-RETURNED TO ICU-TOLERATED WELL-SR ON MONITOR
0845-AMBULATED IN UNIT WITH PHYSICAL THERAPY
1030-DR ALEJANDRO IN UNIT
1300-PT SITTING UP IN CHAIR-ASSISTED WITH AMBULATION IN UNIT
1345-DR LOMBARDO AT BEDSIDE-NGT CLAMPED AS ORDERED-STRICT ICE CHIPS ONLY -CLAMP
FOR 4HRS-

## 2017-04-28 NOTE — NUR
REASSESSMENT COMPLETED. SEE ASSESSMENT FLOWSHEET. AWOKE BRIEFLY. O2 SATS
DROPPING DUE TO APNEA. PLACED ON 2LPM/NC. REPORTS GETTING SOME SLEEP. INFORMED
IT HAD BEEN ONE HOUR SINCE I WALKED OUT THE ROOM AFTER GIVING HIM HIS ATIVAN.
REPORTS "THAT IS ABOUT ALL I SLEEP". NO NEW ACUTE CHANGES ON ASSESSMENT.
SNORE-LIKE NOISES HEARD MOMENTS AFTER ASSESSMENT COMPLETED. TALKING WITH EYES
CLOSED, NOT MAKING SENSE. WILL MONITOR.

## 2017-04-28 NOTE — NUR
PT TAKEN TO PA AND LAT. IN RADIOLOGY. HAS HAD ANOTHER DARK LIQUID STOOL.
COMPLETE LINEN CHANGE COMPLETED AT THIS TIME. VSS. WILL MONITOR.

## 2017-04-28 NOTE — NUR
SHIFT ASSESSMENT COMPLETED. SEE ASSESSMENT FLOWSHEET. SITTING UP IN BEDSIDE
CHAIR RECLINED BACK. RT NARE NG TUBE TO SUCTION. CLEAR ORANGE COLORED DRAINAGE
BEING REMOVED. REPORTS HAVING POPSICLE EARLIER. TPM SET AT VVI-30; VMA OF 10
AND SENSING ONLY. BATTERY ON TPM DUE TO BE CHANGED ON MAY 1ST. MIDSTERNAL DRSG
C/D/I. UPPER ABD AREA DRSG TO TPM WIRE SITES C/D/I. RT LEG GRAFT SITE DRSG
INTACT WITH UPPER THIGH AREA WITH SEROSANGUINOUS DRAINAGE NOTED. PALPABLE
PERIPHERAL PULSES INTACT X4 EXTREMITIES. CESAR HOSE ON. POSTERIOR LEG BRUISING
NOTED. WILL MONITOR.

## 2017-04-29 VITALS — DIASTOLIC BLOOD PRESSURE: 66 MMHG | SYSTOLIC BLOOD PRESSURE: 113 MMHG

## 2017-04-29 VITALS — DIASTOLIC BLOOD PRESSURE: 60 MMHG | SYSTOLIC BLOOD PRESSURE: 116 MMHG

## 2017-04-29 VITALS — SYSTOLIC BLOOD PRESSURE: 136 MMHG | DIASTOLIC BLOOD PRESSURE: 61 MMHG

## 2017-04-29 VITALS — SYSTOLIC BLOOD PRESSURE: 142 MMHG | DIASTOLIC BLOOD PRESSURE: 58 MMHG

## 2017-04-29 VITALS — SYSTOLIC BLOOD PRESSURE: 124 MMHG | DIASTOLIC BLOOD PRESSURE: 59 MMHG

## 2017-04-29 VITALS — SYSTOLIC BLOOD PRESSURE: 126 MMHG | DIASTOLIC BLOOD PRESSURE: 59 MMHG

## 2017-04-29 VITALS — DIASTOLIC BLOOD PRESSURE: 56 MMHG | SYSTOLIC BLOOD PRESSURE: 133 MMHG

## 2017-04-29 VITALS — SYSTOLIC BLOOD PRESSURE: 111 MMHG | DIASTOLIC BLOOD PRESSURE: 57 MMHG

## 2017-04-29 VITALS — SYSTOLIC BLOOD PRESSURE: 121 MMHG | DIASTOLIC BLOOD PRESSURE: 57 MMHG

## 2017-04-29 VITALS — DIASTOLIC BLOOD PRESSURE: 56 MMHG | SYSTOLIC BLOOD PRESSURE: 116 MMHG

## 2017-04-29 VITALS — DIASTOLIC BLOOD PRESSURE: 55 MMHG | SYSTOLIC BLOOD PRESSURE: 134 MMHG

## 2017-04-29 VITALS — SYSTOLIC BLOOD PRESSURE: 136 MMHG | DIASTOLIC BLOOD PRESSURE: 55 MMHG

## 2017-04-29 VITALS — SYSTOLIC BLOOD PRESSURE: 131 MMHG | DIASTOLIC BLOOD PRESSURE: 64 MMHG

## 2017-04-29 VITALS — SYSTOLIC BLOOD PRESSURE: 121 MMHG | DIASTOLIC BLOOD PRESSURE: 54 MMHG

## 2017-04-29 VITALS — DIASTOLIC BLOOD PRESSURE: 57 MMHG | SYSTOLIC BLOOD PRESSURE: 135 MMHG

## 2017-04-29 VITALS — SYSTOLIC BLOOD PRESSURE: 117 MMHG | DIASTOLIC BLOOD PRESSURE: 49 MMHG

## 2017-04-29 VITALS — DIASTOLIC BLOOD PRESSURE: 61 MMHG | SYSTOLIC BLOOD PRESSURE: 121 MMHG

## 2017-04-29 VITALS — DIASTOLIC BLOOD PRESSURE: 54 MMHG | SYSTOLIC BLOOD PRESSURE: 117 MMHG

## 2017-04-29 VITALS — SYSTOLIC BLOOD PRESSURE: 118 MMHG | DIASTOLIC BLOOD PRESSURE: 58 MMHG

## 2017-04-29 VITALS — SYSTOLIC BLOOD PRESSURE: 133 MMHG | DIASTOLIC BLOOD PRESSURE: 64 MMHG

## 2017-04-29 VITALS — SYSTOLIC BLOOD PRESSURE: 104 MMHG | DIASTOLIC BLOOD PRESSURE: 62 MMHG

## 2017-04-29 VITALS — DIASTOLIC BLOOD PRESSURE: 58 MMHG | SYSTOLIC BLOOD PRESSURE: 115 MMHG

## 2017-04-29 VITALS — DIASTOLIC BLOOD PRESSURE: 49 MMHG | SYSTOLIC BLOOD PRESSURE: 123 MMHG

## 2017-04-29 VITALS — SYSTOLIC BLOOD PRESSURE: 129 MMHG | DIASTOLIC BLOOD PRESSURE: 61 MMHG

## 2017-04-29 VITALS — DIASTOLIC BLOOD PRESSURE: 58 MMHG | SYSTOLIC BLOOD PRESSURE: 138 MMHG

## 2017-04-29 VITALS — DIASTOLIC BLOOD PRESSURE: 60 MMHG | SYSTOLIC BLOOD PRESSURE: 135 MMHG

## 2017-04-29 LAB
ANION GAP SERPL CALC-SCNC: 8.3 MMOL/L (ref 8–16)
BUN SERPL-MCNC: 21 MG/DL (ref 7–18)
CALCIUM SERPL-MCNC: 7.3 MG/DL (ref 8.5–10.1)
CHLORIDE SERPL-SCNC: 101 MMOL/L (ref 98–107)
CO2 SERPL-SCNC: 30.2 MMOL/L (ref 21–32)
CREAT SERPL-MCNC: 1.2 MG/DL (ref 0.6–1.3)
ERYTHROCYTE [DISTWIDTH] IN BLOOD BY AUTOMATED COUNT: 14.4 % (ref 11.5–14.5)
GLUCOSE SERPL-MCNC: 197 MG/DL (ref 74–106)
HCT VFR BLD CALC: 22.8 % (ref 42–54)
HGB BLD-MCNC: 7.2 G/DL (ref 13.5–17.5)
MCH RBC QN AUTO: 31.2 PG (ref 26–34)
MCHC RBC AUTO-ENTMCNC: 31.6 G/DL (ref 31–37)
MCV RBC: 98.7 FL (ref 80–100)
OSMOLALITY SERPL CALC.SUM OF ELEC: 279 MOSM/KG (ref 275–300)
PLATELET # BLD: 183 10X3/UL (ref 130–400)
PMV BLD AUTO: 9.1 FL (ref 7.4–10.4)
POTASSIUM SERPL-SCNC: 3.5 MMOL/L (ref 3.5–5.1)
RBC # BLD AUTO: 2.31 10X6/UL (ref 4.2–6.1)
SODIUM SERPL-SCNC: 136 MMOL/L (ref 136–145)
WBC # BLD AUTO: 7.9 10X3/UL (ref 4.8–10.8)

## 2017-04-29 NOTE — NUR
DR. ALEJANDRO CALLED AND INFORMED OF HEART RHYTHM, ABG RESULTS. DOES NOT WANT TO
TREAT H & H TILL SERUM THIS AM. NEW ORDERS RECEIVED FOR AMIO BOLUS. INFORMED
PT OF PLAN. LEFT CVL WILL NOT DRAW TO GET SERUM POTASSIUM WHEN ABG DONE.
SONALI IN LAB MADE AWARE LAB WON'T DRAW. WILL MONITOR.

## 2017-04-29 NOTE — NUR
IV ATIVAN GIVEN TO HELP HIM SLEEP PER REQUEST. ONLY WANTED HALF THE DOSAGE.
0.5MG GIVEN. REASSESSMENT COMPLETED. SEE ASSESSMENT FLOWSHEET. NO CHANGES.
WILL MONITOR.

## 2017-04-29 NOTE — NUR
1130-DR ALEJANDRO IN UNIT-NGT D/C'D AS DIRECTED-
1145-PT TO XRAY ON TELEMETRY-FOR 2 VIEW ABD
1200-RETURNED TO ICU-[facility] UNIT [id number] STARTED AS ORDERED
1330-AMBULATED TO WAITING AREA TO VISIT WITH FAMILY-[facility] INFUSING AND PT ON
TELEMETRY-SR
1410-[facility] INFUSED-TOLERATED WELL-ICE CHIPS ONLY
1436-[facility] UNIT [id number] STARTED
1500-AMBULATED TO WAITING AREA TO VISIT WITH FAMILY-TELEMETRY ON AND [facility]
INFUSING
1650-[facility] COMPLETED-PT IN BED WITH MARKED SNORING NOTABLE-O2 SAT REMAINS 95%
1745-DR BLISS IN UNIT-STATUS REPORT GIVEN
1815-ASSISTED TO BEDSDIE CHAIR AND TOLERATED WELL-KBRN

## 2017-04-29 NOTE — NUR
MAKING NOISES WITH EYES CLOSED. AWOKE UPON TACTILE STIMULATION. DENIES NEEDS.
REPORTS DREAMING. WILL MONITOR.

## 2017-04-29 NOTE — NUR
HUNG IV POTASSIUM RIDER FOR K+=3.2 AMIODARONE BOLUS COMPLETED. REASSESSMENT
COMPLETED. SEE ASSESSMENT FLOWSHEET. AFIB 'S ON THE MONITOR. WILL
MONITOR.

## 2017-04-29 NOTE — NUR
2100 MEDS GIVEN. DISCONNECTED FROM MONITORING SYSTEM TO GO TO BATHROOM. BACK
TO BED AND PULLED UP FOR COMFORT. RECONNECTED TO MONITORING SYSTEM.
2220: RT LEG DRSG CHANGED PER PROTOCOL. MID UPPER THIGH OLD THA DRAIN SITE
OOZING SEROUS FLUID.
2230: OLD CHEST TUBE SITES AND TPM WIRE SITE DRSG CHANGED PER PROTOCOL. DENIES
NEED FOR SLEEPING MEDICATION TONIGHT. WILL MONITOR.

## 2017-04-29 NOTE — NUR
COUGHING AFTER PERIOD OF APNEA. PLACED ON 2LPM/NC PER REQUEST. POPSICLE
(SUGAR-FREE) GIVEN PER REQUEST. NG TUBE SUCTION ON. WILL MONITOR.

## 2017-04-29 NOTE — NUR
SHIFT ASSESSMENT COMPLETED. SEE ASSESSMENT FLOWSHEET. SITTING IN BEDSIDE
CHAIR, RECLINED BACK. SINUS RHYTHM IN THE 70-80'S ON THE MONITOR. TPM SENSING
ONLY. DENIES PAIN. DENIES NAUSEA. NEW CUP OF ICE CHIPS GIVEN. WILL MONITOR.

## 2017-04-29 NOTE — NUR
CRITICAL HGB RECEIVED FROM LAB. NO SIGNIFICANT CHANGE SINCE YESTERDAY. ORDERED
NEW TYPE AND SCREEN FOR POSSIBLE TRANSFUSION IF NEEDED.

## 2017-04-29 NOTE — NUR
RECIEVD AWAKE AND ALERT-NOTED NGT DRAINAGE-RED TINGED-PT STATED HAD 4 OR MORE
ORANGE AND RED POPSICLES-ABD SOFT TO TOUCH NOTED AFIB ON MONITOR-CORDARONE
0.5MG/MIN-PENDING T+S FOR CROSSMATCH

## 2017-04-30 VITALS — SYSTOLIC BLOOD PRESSURE: 153 MMHG | DIASTOLIC BLOOD PRESSURE: 58 MMHG

## 2017-04-30 VITALS — DIASTOLIC BLOOD PRESSURE: 65 MMHG | SYSTOLIC BLOOD PRESSURE: 156 MMHG

## 2017-04-30 VITALS — DIASTOLIC BLOOD PRESSURE: 66 MMHG | SYSTOLIC BLOOD PRESSURE: 148 MMHG

## 2017-04-30 VITALS — SYSTOLIC BLOOD PRESSURE: 161 MMHG | DIASTOLIC BLOOD PRESSURE: 58 MMHG

## 2017-04-30 VITALS — DIASTOLIC BLOOD PRESSURE: 50 MMHG | SYSTOLIC BLOOD PRESSURE: 131 MMHG

## 2017-04-30 VITALS — SYSTOLIC BLOOD PRESSURE: 140 MMHG | DIASTOLIC BLOOD PRESSURE: 68 MMHG

## 2017-04-30 VITALS — SYSTOLIC BLOOD PRESSURE: 137 MMHG | DIASTOLIC BLOOD PRESSURE: 68 MMHG

## 2017-04-30 VITALS — SYSTOLIC BLOOD PRESSURE: 125 MMHG | DIASTOLIC BLOOD PRESSURE: 64 MMHG

## 2017-04-30 VITALS — SYSTOLIC BLOOD PRESSURE: 119 MMHG | DIASTOLIC BLOOD PRESSURE: 60 MMHG

## 2017-04-30 VITALS — SYSTOLIC BLOOD PRESSURE: 145 MMHG | DIASTOLIC BLOOD PRESSURE: 60 MMHG

## 2017-04-30 VITALS — DIASTOLIC BLOOD PRESSURE: 61 MMHG | SYSTOLIC BLOOD PRESSURE: 146 MMHG

## 2017-04-30 VITALS — DIASTOLIC BLOOD PRESSURE: 62 MMHG | SYSTOLIC BLOOD PRESSURE: 146 MMHG

## 2017-04-30 VITALS — SYSTOLIC BLOOD PRESSURE: 144 MMHG | DIASTOLIC BLOOD PRESSURE: 61 MMHG

## 2017-04-30 VITALS — DIASTOLIC BLOOD PRESSURE: 56 MMHG | SYSTOLIC BLOOD PRESSURE: 115 MMHG

## 2017-04-30 VITALS — DIASTOLIC BLOOD PRESSURE: 61 MMHG | SYSTOLIC BLOOD PRESSURE: 133 MMHG

## 2017-04-30 VITALS — DIASTOLIC BLOOD PRESSURE: 76 MMHG | SYSTOLIC BLOOD PRESSURE: 161 MMHG

## 2017-04-30 VITALS — DIASTOLIC BLOOD PRESSURE: 59 MMHG | SYSTOLIC BLOOD PRESSURE: 125 MMHG

## 2017-04-30 VITALS — DIASTOLIC BLOOD PRESSURE: 100 MMHG | SYSTOLIC BLOOD PRESSURE: 130 MMHG

## 2017-04-30 VITALS — DIASTOLIC BLOOD PRESSURE: 61 MMHG | SYSTOLIC BLOOD PRESSURE: 138 MMHG

## 2017-04-30 VITALS — SYSTOLIC BLOOD PRESSURE: 135 MMHG | DIASTOLIC BLOOD PRESSURE: 52 MMHG

## 2017-04-30 VITALS — DIASTOLIC BLOOD PRESSURE: 61 MMHG | SYSTOLIC BLOOD PRESSURE: 140 MMHG

## 2017-04-30 VITALS — DIASTOLIC BLOOD PRESSURE: 60 MMHG | SYSTOLIC BLOOD PRESSURE: 162 MMHG

## 2017-04-30 VITALS — SYSTOLIC BLOOD PRESSURE: 136 MMHG | DIASTOLIC BLOOD PRESSURE: 70 MMHG

## 2017-04-30 VITALS — DIASTOLIC BLOOD PRESSURE: 56 MMHG | SYSTOLIC BLOOD PRESSURE: 133 MMHG

## 2017-04-30 LAB
ANION GAP SERPL CALC-SCNC: 11.1 MMOL/L (ref 8–16)
BUN SERPL-MCNC: 16 MG/DL (ref 7–18)
CALCIUM SERPL-MCNC: 7.4 MG/DL (ref 8.5–10.1)
CHLORIDE SERPL-SCNC: 102 MMOL/L (ref 98–107)
CO2 SERPL-SCNC: 28.4 MMOL/L (ref 21–32)
CREAT SERPL-MCNC: 1.1 MG/DL (ref 0.6–1.3)
ERYTHROCYTE [DISTWIDTH] IN BLOOD BY AUTOMATED COUNT: 15.8 % (ref 11.5–14.5)
GLUCOSE SERPL-MCNC: 156 MG/DL (ref 74–106)
HCT VFR BLD CALC: 25.8 % (ref 42–54)
HGB BLD-MCNC: 8.6 G/DL (ref 13.5–17.5)
MCH RBC QN AUTO: 31.9 PG (ref 26–34)
MCHC RBC AUTO-ENTMCNC: 33.3 G/DL (ref 31–37)
MCV RBC: 95.6 FL (ref 80–100)
OSMOLALITY SERPL CALC.SUM OF ELEC: 279 MOSM/KG (ref 275–300)
PLATELET # BLD: 202 10X3/UL (ref 130–400)
PMV BLD AUTO: 9 FL (ref 7.4–10.4)
POTASSIUM SERPL-SCNC: 3.5 MMOL/L (ref 3.5–5.1)
RBC # BLD AUTO: 2.7 10X6/UL (ref 4.2–6.1)
SODIUM SERPL-SCNC: 138 MMOL/L (ref 136–145)
WBC # BLD AUTO: 7.8 10X3/UL (ref 4.8–10.8)

## 2017-04-30 NOTE — NUR
0715-RECIEVED AWAKE AND ALERT-AMBULTED TO BATHROOM-TO CHAIR-DENIES ANY C/O ABD
DISCOMFORT/NAUSEA-NO DISTENTION NOTED SOFT TO TOUCH-OBESE--SR ON MONITOR-ROOM
AIR

## 2017-04-30 NOTE — NUR
BACK TO BED. RECONNECTED TO MONITORING EQUIPMENT. PULLED UP IN BED PER
REQUEST. BARRIER CREAM APPLIED TO BUTTOCKS. WILL MONITOR.

## 2017-04-30 NOTE — NUR
R.T. AT BEDSIDE GIVING BREATHING TREATMENT. AWAKE, AND REPORTS HAVING SLEPT
WELL TILL AWAKING FOR TREATMENT. REASSESSMENT COMPLETED. SEE ASSESSMENT
FLOWSHEET. NO NEW ACUTE CHANGES.

## 2017-04-30 NOTE — NUR
2100 MEDS GIVEN. NO CHANGES IN STATUS AT THIS TIME. PT ABULATED THROUGH UNIT
TWICE. VSS. STEADY GAIT. WILL MONITOR FOR CHANGES

## 2017-04-30 NOTE — NUR
REPORT RECEIVED AND ASSESSMENT COMPLETED. SEE FLOWSHEET FOR FULL DETAILS. VSS.
WILL CONTINUE TO MONITOR.

## 2017-05-01 VITALS — SYSTOLIC BLOOD PRESSURE: 130 MMHG | DIASTOLIC BLOOD PRESSURE: 66 MMHG

## 2017-05-01 VITALS — SYSTOLIC BLOOD PRESSURE: 132 MMHG | DIASTOLIC BLOOD PRESSURE: 60 MMHG

## 2017-05-01 VITALS — DIASTOLIC BLOOD PRESSURE: 46 MMHG | SYSTOLIC BLOOD PRESSURE: 97 MMHG

## 2017-05-01 VITALS — DIASTOLIC BLOOD PRESSURE: 70 MMHG | SYSTOLIC BLOOD PRESSURE: 125 MMHG

## 2017-05-01 VITALS — DIASTOLIC BLOOD PRESSURE: 59 MMHG | SYSTOLIC BLOOD PRESSURE: 116 MMHG

## 2017-05-01 VITALS — DIASTOLIC BLOOD PRESSURE: 48 MMHG | SYSTOLIC BLOOD PRESSURE: 120 MMHG

## 2017-05-01 VITALS — DIASTOLIC BLOOD PRESSURE: 53 MMHG | SYSTOLIC BLOOD PRESSURE: 118 MMHG

## 2017-05-01 VITALS — DIASTOLIC BLOOD PRESSURE: 63 MMHG | SYSTOLIC BLOOD PRESSURE: 120 MMHG

## 2017-05-01 VITALS — SYSTOLIC BLOOD PRESSURE: 146 MMHG | DIASTOLIC BLOOD PRESSURE: 80 MMHG

## 2017-05-01 VITALS — DIASTOLIC BLOOD PRESSURE: 47 MMHG | SYSTOLIC BLOOD PRESSURE: 137 MMHG

## 2017-05-01 VITALS — DIASTOLIC BLOOD PRESSURE: 63 MMHG | SYSTOLIC BLOOD PRESSURE: 136 MMHG

## 2017-05-01 VITALS — SYSTOLIC BLOOD PRESSURE: 124 MMHG | DIASTOLIC BLOOD PRESSURE: 60 MMHG

## 2017-05-01 VITALS — SYSTOLIC BLOOD PRESSURE: 129 MMHG | DIASTOLIC BLOOD PRESSURE: 77 MMHG

## 2017-05-01 VITALS — DIASTOLIC BLOOD PRESSURE: 60 MMHG | SYSTOLIC BLOOD PRESSURE: 128 MMHG

## 2017-05-01 VITALS — DIASTOLIC BLOOD PRESSURE: 65 MMHG | SYSTOLIC BLOOD PRESSURE: 123 MMHG

## 2017-05-01 VITALS — DIASTOLIC BLOOD PRESSURE: 68 MMHG | SYSTOLIC BLOOD PRESSURE: 98 MMHG

## 2017-05-01 VITALS — SYSTOLIC BLOOD PRESSURE: 108 MMHG | DIASTOLIC BLOOD PRESSURE: 66 MMHG

## 2017-05-01 VITALS — SYSTOLIC BLOOD PRESSURE: 119 MMHG | DIASTOLIC BLOOD PRESSURE: 63 MMHG

## 2017-05-01 VITALS — DIASTOLIC BLOOD PRESSURE: 57 MMHG | SYSTOLIC BLOOD PRESSURE: 124 MMHG

## 2017-05-01 VITALS — DIASTOLIC BLOOD PRESSURE: 57 MMHG | SYSTOLIC BLOOD PRESSURE: 116 MMHG

## 2017-05-01 VITALS — DIASTOLIC BLOOD PRESSURE: 68 MMHG | SYSTOLIC BLOOD PRESSURE: 126 MMHG

## 2017-05-01 VITALS — SYSTOLIC BLOOD PRESSURE: 129 MMHG | DIASTOLIC BLOOD PRESSURE: 60 MMHG

## 2017-05-01 LAB
ANION GAP SERPL CALC-SCNC: 11.4 MMOL/L (ref 8–16)
BUN SERPL-MCNC: 12 MG/DL (ref 7–18)
CALCIUM SERPL-MCNC: 7.6 MG/DL (ref 8.5–10.1)
CHLORIDE SERPL-SCNC: 102 MMOL/L (ref 98–107)
CO2 SERPL-SCNC: 27.5 MMOL/L (ref 21–32)
CREAT SERPL-MCNC: 1.1 MG/DL (ref 0.6–1.3)
ERYTHROCYTE [DISTWIDTH] IN BLOOD BY AUTOMATED COUNT: 15.1 % (ref 11.5–14.5)
GLUCOSE SERPL-MCNC: 153 MG/DL (ref 74–106)
HCT VFR BLD CALC: 28.2 % (ref 42–54)
HGB BLD-MCNC: 9 G/DL (ref 13.5–17.5)
MCH RBC QN AUTO: 30.9 PG (ref 26–34)
MCHC RBC AUTO-ENTMCNC: 31.9 G/DL (ref 31–37)
MCV RBC: 96.9 FL (ref 80–100)
OSMOLALITY SERPL CALC.SUM OF ELEC: 276 MOSM/KG (ref 275–300)
PLATELET # BLD: 243 10X3/UL (ref 130–400)
PMV BLD AUTO: 9 FL (ref 7.4–10.4)
POTASSIUM SERPL-SCNC: 3.9 MMOL/L (ref 3.5–5.1)
RBC # BLD AUTO: 2.91 10X6/UL (ref 4.2–6.1)
SODIUM SERPL-SCNC: 137 MMOL/L (ref 136–145)
WBC # BLD AUTO: 8.6 10X3/UL (ref 4.8–10.8)

## 2017-05-01 NOTE — NUR
REPORT RECEIVED AND ASSESSMENT COMPLETED. SEE FLOWSHEET FOR FULL DETAILS. VSS.
WILL MONITOR THROUGHOUT SHIFT FOR CHANGES.

## 2017-05-01 NOTE — NUR
PT SITTING UP IN CHAIR, NO C/O PAIN, VSS. REASSESSMENT COMPLETED, SEE FLOW
SHEET. ROOM FREE OF CLUTTER, CALL LIGHT IN REACH, WILL CONTINUE TO MONITOR PT.

## 2017-05-01 NOTE — NUR
Nutrition Follow Up:
Chart reviewed. Pt is tolerating clears and is hungry. I<O. +BM 4/30/17. Wt
loss 3# since admit. Labs reviewed - Glucose elevated. Meds noted including
Reglan, Zofran, Humalog, Albumin.
Rec continue advancing diet as tolerated. RD will continue to monitor pt
progress.

## 2017-05-01 NOTE — NUR
2100 MEDS GIVEN. NO CHANGES IN STATUS AT THIS TIME. PT HAD ONE LARGE LIQUID BM
IN TOILET. VSS. WILL MONITOR

## 2017-05-01 NOTE — NUR
DC'ED LEFT SUBCLAVIAN CVL PER ORDER 4X4'S APPLIED, TEGADERM APPLIED, WILL
MONITOR FOR BLEEDING.  SITED PIV TO RIGHT AC, FLUSHES WELL, SALINE LOCKED.

## 2017-05-01 NOTE — NUR
REASSESSMENT COMPLETED. SEE FLOWSHEET FOR FULL DETAILS. NO OTHER CHANGES TO
REPORT AT THIS TIME.VSS. WILL MONITOR

## 2017-05-01 NOTE — NUR
PT REPORT REC'D, PT CARE ASSUMED. PT AAOX4, SITTING UP IN BED, NO C/O PAIN,
VSS. SHIFT ASSESSMENT COMPLETED, SEE FLOW SHEET. UP TO BATHROOM AS NEEDED.
ROOM FREE OF CLUTTER, CALL LIGHT IN REACH. WILLL CONTINUE TO MONITOR PT.

## 2017-05-02 VITALS — SYSTOLIC BLOOD PRESSURE: 130 MMHG | DIASTOLIC BLOOD PRESSURE: 81 MMHG

## 2017-05-02 VITALS — SYSTOLIC BLOOD PRESSURE: 104 MMHG | DIASTOLIC BLOOD PRESSURE: 39 MMHG

## 2017-05-02 VITALS — SYSTOLIC BLOOD PRESSURE: 129 MMHG | DIASTOLIC BLOOD PRESSURE: 63 MMHG

## 2017-05-02 VITALS — SYSTOLIC BLOOD PRESSURE: 117 MMHG | DIASTOLIC BLOOD PRESSURE: 61 MMHG

## 2017-05-02 VITALS — DIASTOLIC BLOOD PRESSURE: 60 MMHG | SYSTOLIC BLOOD PRESSURE: 109 MMHG

## 2017-05-02 VITALS — DIASTOLIC BLOOD PRESSURE: 64 MMHG | SYSTOLIC BLOOD PRESSURE: 132 MMHG

## 2017-05-02 VITALS — DIASTOLIC BLOOD PRESSURE: 57 MMHG | SYSTOLIC BLOOD PRESSURE: 117 MMHG

## 2017-05-02 VITALS — DIASTOLIC BLOOD PRESSURE: 83 MMHG | SYSTOLIC BLOOD PRESSURE: 128 MMHG

## 2017-05-02 VITALS — DIASTOLIC BLOOD PRESSURE: 58 MMHG | SYSTOLIC BLOOD PRESSURE: 103 MMHG

## 2017-05-02 VITALS — SYSTOLIC BLOOD PRESSURE: 134 MMHG | DIASTOLIC BLOOD PRESSURE: 83 MMHG

## 2017-05-02 VITALS — SYSTOLIC BLOOD PRESSURE: 140 MMHG | DIASTOLIC BLOOD PRESSURE: 62 MMHG

## 2017-05-02 LAB
ANION GAP SERPL CALC-SCNC: 11.1 MMOL/L (ref 8–16)
BUN SERPL-MCNC: 12 MG/DL (ref 7–18)
CALCIUM SERPL-MCNC: 7.7 MG/DL (ref 8.5–10.1)
CHLORIDE SERPL-SCNC: 104 MMOL/L (ref 98–107)
CO2 SERPL-SCNC: 25.9 MMOL/L (ref 21–32)
CREAT SERPL-MCNC: 1.2 MG/DL (ref 0.6–1.3)
ERYTHROCYTE [DISTWIDTH] IN BLOOD BY AUTOMATED COUNT: 15.1 % (ref 11.5–14.5)
GLUCOSE SERPL-MCNC: 145 MG/DL (ref 74–106)
HCT VFR BLD CALC: 26.3 % (ref 42–54)
HGB BLD-MCNC: 8.3 G/DL (ref 13.5–17.5)
MCH RBC QN AUTO: 31 PG (ref 26–34)
MCHC RBC AUTO-ENTMCNC: 31.6 G/DL (ref 31–37)
MCV RBC: 98.1 FL (ref 80–100)
OSMOLALITY SERPL CALC.SUM OF ELEC: 276 MOSM/KG (ref 275–300)
PLATELET # BLD: 131 10X3/UL (ref 130–400)
PMV BLD AUTO: 10.2 FL (ref 7.4–10.4)
POTASSIUM SERPL-SCNC: 4 MMOL/L (ref 3.5–5.1)
RBC # BLD AUTO: 2.68 10X6/UL (ref 4.2–6.1)
SODIUM SERPL-SCNC: 137 MMOL/L (ref 136–145)
WBC # BLD AUTO: 7.7 10X3/UL (ref 4.8–10.8)

## 2017-05-18 ENCOUNTER — HOSPITAL ENCOUNTER (OUTPATIENT)
Dept: HOSPITAL 84 - D.RAD | Age: 67
Discharge: HOME | End: 2017-05-18
Attending: THORACIC SURGERY (CARDIOTHORACIC VASCULAR SURGERY)
Payer: MEDICARE

## 2017-05-18 VITALS — BODY MASS INDEX: 33.1 KG/M2

## 2017-05-18 DIAGNOSIS — J90: ICD-10-CM

## 2017-05-18 DIAGNOSIS — D64.9: Primary | ICD-10-CM

## 2017-05-18 LAB
ANION GAP SERPL CALC-SCNC: 14.4 MMOL/L (ref 8–16)
BUN SERPL-MCNC: 17 MG/DL (ref 7–18)
CALCIUM SERPL-MCNC: 9.6 MG/DL (ref 8.5–10.1)
CHLORIDE SERPL-SCNC: 101 MMOL/L (ref 98–107)
CO2 SERPL-SCNC: 26.8 MMOL/L (ref 21–32)
CREAT SERPL-MCNC: 1.5 MG/DL (ref 0.6–1.3)
ERYTHROCYTE [DISTWIDTH] IN BLOOD BY AUTOMATED COUNT: 14.3 % (ref 11.5–14.5)
GLUCOSE SERPL-MCNC: 208 MG/DL (ref 74–106)
HCT VFR BLD CALC: 39.4 % (ref 42–54)
HGB BLD-MCNC: 12.7 G/DL (ref 13.5–17.5)
MCH RBC QN AUTO: 31.5 PG (ref 26–34)
MCHC RBC AUTO-ENTMCNC: 32.2 G/DL (ref 31–37)
MCV RBC: 97.8 FL (ref 80–100)
OSMOLALITY SERPL CALC.SUM OF ELEC: 281 MOSM/KG (ref 275–300)
PLATELET # BLD: 193 10X3/UL (ref 130–400)
PMV BLD AUTO: 8.6 FL (ref 7.4–10.4)
POTASSIUM SERPL-SCNC: 5.2 MMOL/L (ref 3.5–5.1)
RBC # BLD AUTO: 4.03 10X6/UL (ref 4.2–6.1)
SODIUM SERPL-SCNC: 137 MMOL/L (ref 136–145)
WBC # BLD AUTO: 5.5 10X3/UL (ref 4.8–10.8)

## 2017-06-07 ENCOUNTER — HOSPITAL ENCOUNTER (OUTPATIENT)
Dept: HOSPITAL 84 - D.SLEEP | Age: 67
Discharge: HOME | End: 2017-06-07
Attending: FAMILY MEDICINE
Payer: MEDICARE

## 2017-06-07 VITALS — BODY MASS INDEX: 33.1 KG/M2

## 2017-06-07 DIAGNOSIS — G47.33: Primary | ICD-10-CM

## 2019-06-13 ENCOUNTER — HOSPITAL ENCOUNTER (OUTPATIENT)
Dept: HOSPITAL 84 - D.HCCARDIO | Age: 69
Discharge: HOME | End: 2019-06-13
Attending: INTERNAL MEDICINE
Payer: MEDICARE

## 2019-06-13 VITALS — BODY MASS INDEX: 33.1 KG/M2

## 2019-06-13 DIAGNOSIS — I25.10: Primary | ICD-10-CM

## 2020-07-06 ENCOUNTER — HOSPITAL ENCOUNTER (OUTPATIENT)
Dept: HOSPITAL 84 - D.HCCECHO | Age: 70
Discharge: HOME | End: 2020-07-06
Attending: INTERNAL MEDICINE
Payer: MEDICARE

## 2020-07-06 VITALS — BODY MASS INDEX: 33.1 KG/M2

## 2020-07-06 DIAGNOSIS — I25.10: Primary | ICD-10-CM

## 2023-06-02 NOTE — NUR
PT FAMILY AT THE BEDSIDE, ALL QUESTIONS ANSWERED, VSS, WILL CONTINUE TO
MONITOR PT. No cyanosis, no pallor, no jaundice, no rash